# Patient Record
Sex: MALE | Race: WHITE | NOT HISPANIC OR LATINO | Employment: OTHER | URBAN - NONMETROPOLITAN AREA
[De-identification: names, ages, dates, MRNs, and addresses within clinical notes are randomized per-mention and may not be internally consistent; named-entity substitution may affect disease eponyms.]

---

## 2018-05-24 ENCOUNTER — OFFICE VISIT CONVERTED (OUTPATIENT)
Dept: FAMILY MEDICINE CLINIC | Age: 68
End: 2018-05-24
Attending: FAMILY MEDICINE

## 2019-01-10 ENCOUNTER — OFFICE VISIT CONVERTED (OUTPATIENT)
Dept: FAMILY MEDICINE CLINIC | Age: 69
End: 2019-01-10
Attending: FAMILY MEDICINE

## 2019-01-10 ENCOUNTER — HOSPITAL ENCOUNTER (OUTPATIENT)
Dept: OTHER | Facility: HOSPITAL | Age: 69
Discharge: HOME | End: 2019-01-10
Attending: FAMILY MEDICINE

## 2019-01-10 LAB
ALBUMIN SERPL-MCNC: 4.4 G/DL (ref 3.5–5)
ALBUMIN/GLOB SERPL: 1.5 {RATIO} (ref 1.4–2.6)
ALP SERPL-CCNC: 107 U/L (ref 56–155)
ALT SERPL-CCNC: 22 U/L (ref 10–40)
ANION GAP SERPL CALC-SCNC: 17 MMOL/L (ref 8–19)
AST SERPL-CCNC: 21 U/L (ref 15–50)
BILIRUB SERPL-MCNC: 0.49 MG/DL (ref 0.2–1.3)
BUN SERPL-MCNC: 18 MG/DL (ref 5–25)
BUN/CREAT SERPL: 17 {RATIO} (ref 6–20)
CALCIUM SERPL-MCNC: 9.7 MG/DL (ref 8.7–10.4)
CHLORIDE SERPL-SCNC: 102 MMOL/L (ref 99–111)
CHOLEST SERPL-MCNC: 155 MG/DL (ref 107–200)
CHOLEST/HDLC SERPL: 3.2 {RATIO} (ref 3–6)
CONV CO2: 27 MMOL/L (ref 22–32)
CONV TOTAL PROTEIN: 7.4 G/DL (ref 6.3–8.2)
CREAT UR-MCNC: 1.06 MG/DL (ref 0.7–1.2)
GFR SERPLBLD BASED ON 1.73 SQ M-ARVRAT: >60 ML/MIN/{1.73_M2}
GLOBULIN UR ELPH-MCNC: 3 G/DL (ref 2–3.5)
GLUCOSE SERPL-MCNC: 86 MG/DL (ref 70–99)
HDLC SERPL-MCNC: 49 MG/DL (ref 40–60)
LDLC SERPL CALC-MCNC: 75 MG/DL (ref 70–100)
MAGNESIUM SERPL-MCNC: 1.97 MG/DL (ref 1.6–2.3)
OSMOLALITY SERPL CALC.SUM OF ELEC: 295 MOSM/KG (ref 273–304)
POTASSIUM SERPL-SCNC: 4.1 MMOL/L (ref 3.5–5.3)
SODIUM SERPL-SCNC: 142 MMOL/L (ref 135–147)
TRIGL SERPL-MCNC: 156 MG/DL (ref 40–150)
TSH SERPL-ACNC: 4.11 M[IU]/L (ref 0.27–4.2)
VLDLC SERPL-MCNC: 31 MG/DL (ref 5–37)

## 2019-04-11 ENCOUNTER — OFFICE VISIT CONVERTED (OUTPATIENT)
Dept: FAMILY MEDICINE CLINIC | Age: 69
End: 2019-04-11
Attending: NURSE PRACTITIONER

## 2019-10-03 ENCOUNTER — HOSPITAL ENCOUNTER (OUTPATIENT)
Dept: OTHER | Facility: HOSPITAL | Age: 69
Discharge: HOME | End: 2019-10-03
Attending: FAMILY MEDICINE

## 2019-10-03 ENCOUNTER — OFFICE VISIT CONVERTED (OUTPATIENT)
Dept: FAMILY MEDICINE CLINIC | Age: 69
End: 2019-10-03
Attending: FAMILY MEDICINE

## 2019-10-03 LAB
ALBUMIN SERPL-MCNC: 4.5 G/DL (ref 3.5–5)
ALBUMIN/GLOB SERPL: 1.7 {RATIO} (ref 1.4–2.6)
ALP SERPL-CCNC: 111 U/L (ref 56–155)
ALT SERPL-CCNC: 23 U/L (ref 10–40)
ANION GAP SERPL CALC-SCNC: 24 MMOL/L (ref 8–19)
AST SERPL-CCNC: 24 U/L (ref 15–50)
BILIRUB SERPL-MCNC: 0.59 MG/DL (ref 0.2–1.3)
BUN SERPL-MCNC: 18 MG/DL (ref 5–25)
BUN/CREAT SERPL: 16 {RATIO} (ref 6–20)
CALCIUM SERPL-MCNC: 10.2 MG/DL (ref 8.7–10.4)
CHLORIDE SERPL-SCNC: 102 MMOL/L (ref 99–111)
CONV CO2: 20 MMOL/L (ref 22–32)
CONV TOTAL PROTEIN: 7.2 G/DL (ref 6.3–8.2)
CREAT UR-MCNC: 1.12 MG/DL (ref 0.7–1.2)
GFR SERPLBLD BASED ON 1.73 SQ M-ARVRAT: >60 ML/MIN/{1.73_M2}
GLOBULIN UR ELPH-MCNC: 2.7 G/DL (ref 2–3.5)
GLUCOSE SERPL-MCNC: 89 MG/DL (ref 70–99)
OSMOLALITY SERPL CALC.SUM OF ELEC: 295 MOSM/KG (ref 273–304)
POTASSIUM SERPL-SCNC: 3.8 MMOL/L (ref 3.5–5.3)
PSA SERPL-MCNC: 2.69 NG/ML (ref 0–4)
SODIUM SERPL-SCNC: 142 MMOL/L (ref 135–147)
TSH SERPL-ACNC: 3.36 M[IU]/L (ref 0.27–4.2)

## 2020-04-08 ENCOUNTER — HOSPITAL ENCOUNTER (OUTPATIENT)
Dept: OTHER | Facility: HOSPITAL | Age: 70
Discharge: HOME | End: 2020-04-08
Attending: NURSE PRACTITIONER

## 2020-04-08 ENCOUNTER — OFFICE VISIT CONVERTED (OUTPATIENT)
Dept: FAMILY MEDICINE CLINIC | Age: 70
End: 2020-04-08
Attending: NURSE PRACTITIONER

## 2020-04-08 LAB
ALBUMIN SERPL-MCNC: 4.1 G/DL (ref 3.5–5)
ALBUMIN/GLOB SERPL: 1.2 {RATIO} (ref 1.4–2.6)
ALP SERPL-CCNC: 122 U/L (ref 56–155)
ALT SERPL-CCNC: 30 U/L (ref 10–40)
ANION GAP SERPL CALC-SCNC: 19 MMOL/L (ref 8–19)
AST SERPL-CCNC: 37 U/L (ref 15–50)
BASOPHILS # BLD MANUAL: 0.01 10*3/UL (ref 0–0.2)
BASOPHILS NFR BLD MANUAL: 0.2 % (ref 0–3)
BILIRUB SERPL-MCNC: 0.46 MG/DL (ref 0.2–1.3)
BUN SERPL-MCNC: 35 MG/DL (ref 5–25)
BUN/CREAT SERPL: 19 {RATIO} (ref 6–20)
CALCIUM SERPL-MCNC: 9.1 MG/DL (ref 8.7–10.4)
CHLORIDE SERPL-SCNC: 97 MMOL/L (ref 99–111)
CONV CO2: 23 MMOL/L (ref 22–32)
CONV TOTAL PROTEIN: 7.5 G/DL (ref 6.3–8.2)
CREAT UR-MCNC: 1.88 MG/DL (ref 0.7–1.2)
DEPRECATED RDW RBC AUTO: 42.9 FL
EOSINOPHIL # BLD MANUAL: 0.01 10*3/UL (ref 0–0.7)
EOSINOPHIL NFR BLD MANUAL: 0.2 % (ref 0–7)
ERYTHROCYTE [DISTWIDTH] IN BLOOD BY AUTOMATED COUNT: 12.6 % (ref 11.5–14.5)
GFR SERPLBLD BASED ON 1.73 SQ M-ARVRAT: 35 ML/MIN/{1.73_M2}
GLOBULIN UR ELPH-MCNC: 3.4 G/DL (ref 2–3.5)
GLUCOSE SERPL-MCNC: 102 MG/DL (ref 70–99)
GRANS (ABSOLUTE): 3.64 10*3/UL (ref 2–8)
GRANS: 65.6 % (ref 30–85)
HBA1C MFR BLD: 13.9 G/DL (ref 14–18)
HCT VFR BLD AUTO: 40.2 % (ref 42–52)
IMM GRANULOCYTES # BLD: 0.01 10*3/UL (ref 0–0.54)
IMM GRANULOCYTES NFR BLD: 0.2 % (ref 0–0.43)
LYMPHOCYTES # BLD MANUAL: 1.46 10*3/UL (ref 1–5)
LYMPHOCYTES NFR BLD MANUAL: 7.4 % (ref 3–10)
MCH RBC QN AUTO: 31.6 PG (ref 27–31)
MCHC RBC AUTO-ENTMCNC: 34.6 G/DL (ref 33–37)
MCV RBC AUTO: 91.4 FL (ref 80–96)
MONOCYTES # BLD AUTO: 0.41 10*3/UL (ref 0.2–1.2)
OSMOLALITY SERPL CALC.SUM OF ELEC: 288 MOSM/KG (ref 273–304)
PLATELET # BLD AUTO: 157 10*3/UL (ref 130–400)
PMV BLD AUTO: 9.8 FL (ref 7.4–10.4)
POTASSIUM SERPL-SCNC: 3.8 MMOL/L (ref 3.5–5.3)
RBC # BLD AUTO: 4.4 10*6/UL (ref 4.7–6.1)
SODIUM SERPL-SCNC: 135 MMOL/L (ref 135–147)
TESTOST SERPL-MCNC: 111 NG/DL (ref 193–740)
TSH SERPL-ACNC: 2.36 M[IU]/L (ref 0.27–4.2)
VARIANT LYMPHS NFR BLD MANUAL: 26.4 % (ref 20–45)
VIT B12 SERPL-MCNC: 1468 PG/ML (ref 211–911)
WBC # BLD AUTO: 5.54 10*3/UL (ref 4.8–10.8)

## 2020-04-13 ENCOUNTER — OFFICE VISIT CONVERTED (OUTPATIENT)
Dept: FAMILY MEDICINE CLINIC | Age: 70
End: 2020-04-13
Attending: FAMILY MEDICINE

## 2020-04-13 ENCOUNTER — HOSPITAL ENCOUNTER (OUTPATIENT)
Dept: OTHER | Facility: HOSPITAL | Age: 70
Discharge: HOME | End: 2020-04-13
Attending: FAMILY MEDICINE

## 2020-04-13 LAB
ALBUMIN SERPL-MCNC: 3.5 G/DL (ref 3.5–5)
ALBUMIN/GLOB SERPL: 1.1 {RATIO} (ref 1.4–2.6)
ALP SERPL-CCNC: 115 U/L (ref 56–155)
ALT SERPL-CCNC: 30 U/L (ref 10–40)
ANION GAP SERPL CALC-SCNC: 19 MMOL/L (ref 8–19)
AST SERPL-CCNC: 43 U/L (ref 15–50)
BILIRUB SERPL-MCNC: 0.48 MG/DL (ref 0.2–1.3)
BNP SERPL-MCNC: 117 PG/ML (ref 0–900)
BUN SERPL-MCNC: 39 MG/DL (ref 5–25)
BUN/CREAT SERPL: 19 {RATIO} (ref 6–20)
CALCIUM SERPL-MCNC: 9.1 MG/DL (ref 8.7–10.4)
CHLORIDE SERPL-SCNC: 96 MMOL/L (ref 99–111)
CK SERPL-CCNC: 238 U/L (ref 57–374)
CONV CO2: 23 MMOL/L (ref 22–32)
CONV TOTAL PROTEIN: 6.8 G/DL (ref 6.3–8.2)
CREAT UR-MCNC: 2.03 MG/DL (ref 0.7–1.2)
ERYTHROCYTE [DISTWIDTH] IN BLOOD BY AUTOMATED COUNT: 12.8 % (ref 11.6–14.4)
GFR SERPLBLD BASED ON 1.73 SQ M-ARVRAT: 32 ML/MIN/{1.73_M2}
GLOBULIN UR ELPH-MCNC: 3.3 G/DL (ref 2–3.5)
GLUCOSE SERPL-MCNC: 99 MG/DL (ref 70–99)
HCT VFR BLD AUTO: 38.2 % (ref 42–52)
HGB BLD-MCNC: 13.5 G/DL (ref 14–18)
MCH RBC QN AUTO: 32.2 PG (ref 27–31)
MCHC RBC AUTO-ENTMCNC: 35.3 G/DL (ref 33–37)
MCV RBC AUTO: 91.2 FL (ref 80–96)
OSMOLALITY SERPL CALC.SUM OF ELEC: 289 MOSM/KG (ref 273–304)
PLATELET # BLD AUTO: 222 10*3/UL (ref 130–400)
PMV BLD AUTO: 10.7 FL (ref 9.4–12.4)
POTASSIUM SERPL-SCNC: 3.2 MMOL/L (ref 3.5–5.3)
RBC # BLD AUTO: 4.19 10*6/UL (ref 4.7–6.1)
SODIUM SERPL-SCNC: 135 MMOL/L (ref 135–147)
WBC # BLD AUTO: 7.26 10*3/UL (ref 4.8–10.8)

## 2020-04-14 ENCOUNTER — HOSPITAL ENCOUNTER (OUTPATIENT)
Dept: OTHER | Facility: HOSPITAL | Age: 70
Discharge: HOME | End: 2020-04-14
Attending: FAMILY MEDICINE

## 2020-04-14 LAB — B BURGDOR IGG+IGM SER-ACNC: <0.91 ISR (ref 0–0.9)

## 2020-04-16 ENCOUNTER — HOSPITAL ENCOUNTER (OUTPATIENT)
Dept: OTHER | Facility: HOSPITAL | Age: 70
Discharge: HOME | End: 2020-04-16
Attending: FAMILY MEDICINE

## 2020-04-16 LAB
ANION GAP SERPL CALC-SCNC: 18 MMOL/L (ref 8–19)
APPEARANCE UR: CLEAR
BACTERIA UR QL AUTO: NORMAL
BILIRUB UR QL: NEGATIVE
BUN SERPL-MCNC: 16 MG/DL (ref 5–25)
BUN/CREAT SERPL: 15 {RATIO} (ref 6–20)
CALCIUM SERPL-MCNC: 9.7 MG/DL (ref 8.7–10.4)
CASTS URNS QL MICRO: NORMAL /[LPF]
CHLORIDE SERPL-SCNC: 101 MMOL/L (ref 99–111)
COLOR UR: YELLOW
CONV CO2: 25 MMOL/L (ref 22–32)
CONV LEUKOCYTE ESTERASE: NEGATIVE
CONV UROBILINOGEN IN URINE BY AUTOMATED TEST STRIP: 0.2 {EHRLICHU}/DL (ref 0.1–1)
CREAT UR-MCNC: 1.09 MG/DL (ref 0.7–1.2)
EPI CELLS #/AREA URNS HPF: NORMAL /[HPF]
GFR SERPLBLD BASED ON 1.73 SQ M-ARVRAT: >60 ML/MIN/{1.73_M2}
GLUCOSE 24H UR-MCNC: NEGATIVE MG/DL
GLUCOSE SERPL-MCNC: 94 MG/DL (ref 70–99)
HGB UR QL STRIP: NEGATIVE
KETONES UR QL STRIP: NEGATIVE MG/DL
MAGNESIUM SERPL-MCNC: 1.64 MG/DL (ref 1.6–2.3)
MUCOUS THREADS URNS QL MICRO: NORMAL
NITRITE UR-MCNC: NEGATIVE MG/ML
OSMOLALITY SERPL CALC.SUM OF ELEC: 291 MOSM/KG (ref 273–304)
PH UR STRIP.AUTO: 7 [PH] (ref 5–8)
POTASSIUM SERPL-SCNC: 3.8 MMOL/L (ref 3.5–5.3)
PROT UR-MCNC: NEGATIVE MG/DL
RBC # BLD AUTO: NORMAL /[HPF]
SODIUM SERPL-SCNC: 140 MMOL/L (ref 135–147)
SP GR UR STRIP: 1.01 (ref 1–1.03)
SPECIMEN SOURCE: NORMAL
UNIDENT CRYS URNS QL MICRO: NORMAL /[HPF]
WBC #/AREA URNS HPF: NORMAL /[HPF]

## 2020-04-17 LAB — SARS-COV-2 RNA SPEC QL NAA+PROBE: NOT DETECTED

## 2020-05-21 ENCOUNTER — HOSPITAL ENCOUNTER (OUTPATIENT)
Dept: OTHER | Facility: HOSPITAL | Age: 70
Discharge: HOME | End: 2020-05-21
Attending: FAMILY MEDICINE

## 2020-05-21 ENCOUNTER — OFFICE VISIT CONVERTED (OUTPATIENT)
Dept: FAMILY MEDICINE CLINIC | Age: 70
End: 2020-05-21
Attending: FAMILY MEDICINE

## 2020-05-21 LAB
ANION GAP SERPL CALC-SCNC: 16 MMOL/L (ref 8–19)
APPEARANCE UR: CLEAR
BACTERIA UR QL AUTO: NORMAL
BILIRUB UR QL: NEGATIVE
BUN SERPL-MCNC: 15 MG/DL (ref 5–25)
BUN/CREAT SERPL: 17 {RATIO} (ref 6–20)
CALCIUM SERPL-MCNC: 9.2 MG/DL (ref 8.7–10.4)
CASTS URNS QL MICRO: NORMAL /[LPF]
CHLORIDE SERPL-SCNC: 103 MMOL/L (ref 99–111)
COLOR UR: YELLOW
CONV CO2: 25 MMOL/L (ref 22–32)
CONV LEUKOCYTE ESTERASE: NEGATIVE
CONV UROBILINOGEN IN URINE BY AUTOMATED TEST STRIP: 0.2 {EHRLICHU}/DL (ref 0.1–1)
CREAT UR-MCNC: 0.89 MG/DL (ref 0.7–1.2)
EPI CELLS #/AREA URNS HPF: NORMAL /[HPF]
GFR SERPLBLD BASED ON 1.73 SQ M-ARVRAT: >60 ML/MIN/{1.73_M2}
GLUCOSE 24H UR-MCNC: NEGATIVE MG/DL
GLUCOSE SERPL-MCNC: 91 MG/DL (ref 70–99)
HGB UR QL STRIP: NEGATIVE
KETONES UR QL STRIP: NEGATIVE MG/DL
MAGNESIUM SERPL-MCNC: 2.17 MG/DL (ref 1.6–2.3)
MUCOUS THREADS URNS QL MICRO: NORMAL
NITRITE UR-MCNC: NEGATIVE MG/ML
OSMOLALITY SERPL CALC.SUM OF ELEC: 288 MOSM/KG (ref 273–304)
PH UR STRIP.AUTO: 7 [PH] (ref 5–8)
POTASSIUM SERPL-SCNC: 4.8 MMOL/L (ref 3.5–5.3)
PROT UR-MCNC: NEGATIVE MG/DL
RBC # BLD AUTO: NORMAL /[HPF]
SODIUM SERPL-SCNC: 139 MMOL/L (ref 135–147)
SP GR UR STRIP: 1.02 (ref 1–1.03)
SPECIMEN SOURCE: NORMAL
UNIDENT CRYS URNS QL MICRO: NORMAL /[HPF]
WBC #/AREA URNS HPF: NORMAL /[HPF]

## 2020-08-06 ENCOUNTER — HOSPITAL ENCOUNTER (OUTPATIENT)
Dept: OTHER | Facility: HOSPITAL | Age: 70
Discharge: HOME | End: 2020-08-06
Attending: FAMILY MEDICINE

## 2020-08-06 ENCOUNTER — OFFICE VISIT CONVERTED (OUTPATIENT)
Dept: FAMILY MEDICINE CLINIC | Age: 70
End: 2020-08-06
Attending: FAMILY MEDICINE

## 2020-08-06 LAB
ALBUMIN SERPL-MCNC: 4.5 G/DL (ref 3.5–5)
ALBUMIN/GLOB SERPL: 1.4 {RATIO} (ref 1.4–2.6)
ALP SERPL-CCNC: 103 U/L (ref 56–155)
ALT SERPL-CCNC: 21 U/L (ref 10–40)
ANION GAP SERPL CALC-SCNC: 17 MMOL/L (ref 8–19)
APPEARANCE UR: CLEAR
AST SERPL-CCNC: 24 U/L (ref 15–50)
BILIRUB SERPL-MCNC: 0.48 MG/DL (ref 0.2–1.3)
BILIRUB UR QL: NEGATIVE
BUN SERPL-MCNC: 17 MG/DL (ref 5–25)
BUN/CREAT SERPL: 14 {RATIO} (ref 6–20)
CALCIUM SERPL-MCNC: 10.5 MG/DL (ref 8.7–10.4)
CHLORIDE SERPL-SCNC: 99 MMOL/L (ref 99–111)
CHOLEST SERPL-MCNC: 167 MG/DL (ref 107–200)
CHOLEST/HDLC SERPL: 3.4 {RATIO} (ref 3–6)
COLOR UR: YELLOW
CONV BACTERIA: NEGATIVE
CONV CO2: 28 MMOL/L (ref 22–32)
CONV COLLECTION SOURCE (UA): NORMAL
CONV TOTAL PROTEIN: 7.7 G/DL (ref 6.3–8.2)
CONV UROBILINOGEN IN URINE BY AUTOMATED TEST STRIP: 0.2 {EHRLICHU}/DL (ref 0.1–1)
CREAT UR-MCNC: 1.19 MG/DL (ref 0.7–1.2)
GFR SERPLBLD BASED ON 1.73 SQ M-ARVRAT: >60 ML/MIN/{1.73_M2}
GLOBULIN UR ELPH-MCNC: 3.2 G/DL (ref 2–3.5)
GLUCOSE SERPL-MCNC: 89 MG/DL (ref 70–99)
GLUCOSE UR QL: NEGATIVE MG/DL
HDLC SERPL-MCNC: 49 MG/DL (ref 40–60)
HGB UR QL STRIP: NEGATIVE
KETONES UR QL STRIP: NEGATIVE MG/DL
LDLC SERPL CALC-MCNC: 97 MG/DL (ref 70–100)
LEUKOCYTE ESTERASE UR QL STRIP: NEGATIVE
NITRITE UR QL STRIP: NEGATIVE
OSMOLALITY SERPL CALC.SUM OF ELEC: 291 MOSM/KG (ref 273–304)
PH UR STRIP.AUTO: 7.5 [PH] (ref 5–8)
POTASSIUM SERPL-SCNC: 3.9 MMOL/L (ref 3.5–5.3)
PROT UR QL: NEGATIVE MG/DL
RBC #/AREA URNS HPF: NORMAL /[HPF]
SODIUM SERPL-SCNC: 140 MMOL/L (ref 135–147)
SP GR UR: 1.01 (ref 1–1.03)
TRIGL SERPL-MCNC: 105 MG/DL (ref 40–150)
VLDLC SERPL-MCNC: 21 MG/DL (ref 5–37)
WBC #/AREA URNS HPF: NORMAL /[HPF]

## 2021-04-22 ENCOUNTER — OFFICE VISIT CONVERTED (OUTPATIENT)
Dept: FAMILY MEDICINE CLINIC | Age: 71
End: 2021-04-22
Attending: FAMILY MEDICINE

## 2021-04-22 ENCOUNTER — HOSPITAL ENCOUNTER (OUTPATIENT)
Dept: OTHER | Facility: HOSPITAL | Age: 71
Discharge: HOME | End: 2021-04-22
Attending: FAMILY MEDICINE

## 2021-04-22 LAB
ALBUMIN SERPL-MCNC: 4.4 G/DL (ref 3.5–5)
ALBUMIN/GLOB SERPL: 1.5 {RATIO} (ref 1.4–2.6)
ALP SERPL-CCNC: 118 U/L (ref 56–155)
ALT SERPL-CCNC: 19 U/L (ref 10–40)
ANION GAP SERPL CALC-SCNC: 20 MMOL/L (ref 8–19)
AST SERPL-CCNC: 23 U/L (ref 15–50)
BILIRUB SERPL-MCNC: 0.69 MG/DL (ref 0.2–1.3)
BUN SERPL-MCNC: 16 MG/DL (ref 5–25)
BUN/CREAT SERPL: 16 {RATIO} (ref 6–20)
CALCIUM SERPL-MCNC: 9.6 MG/DL (ref 8.7–10.4)
CHLORIDE SERPL-SCNC: 101 MMOL/L (ref 99–111)
CONV CO2: 25 MMOL/L (ref 22–32)
CONV TOTAL PROTEIN: 7.3 G/DL (ref 6.3–8.2)
CREAT UR-MCNC: 1.03 MG/DL (ref 0.7–1.2)
GFR SERPLBLD BASED ON 1.73 SQ M-ARVRAT: >60 ML/MIN/{1.73_M2}
GLOBULIN UR ELPH-MCNC: 2.9 G/DL (ref 2–3.5)
GLUCOSE SERPL-MCNC: 93 MG/DL (ref 70–99)
OSMOLALITY SERPL CALC.SUM OF ELEC: 295 MOSM/KG (ref 273–304)
POTASSIUM SERPL-SCNC: 4.2 MMOL/L (ref 3.5–5.3)
PSA SERPL-MCNC: 3.16 NG/ML (ref 0–4)
SODIUM SERPL-SCNC: 142 MMOL/L (ref 135–147)
TSH SERPL-ACNC: 3.07 M[IU]/L (ref 0.27–4.2)

## 2021-05-18 NOTE — PROGRESS NOTES
"Eliud Ramirez  1950     Office/Outpatient Visit    Visit Date: Wed, Apr 8, 2020 10:23 am    Provider: Tonya Werner N.P. (Assistant: Laurence Jiménez RN)    Location: [facility]        Electronically signed by Tonya Werner N.P. on  04/09/2020 02:06:29 PM                             Subjective:        CC: Tolu is a 69 year old White male.  Decrease energy/sleeping a lot, decreased appetite         HPI: declines telehealth visit          Other fatigue noted.  Patient describes problem of moderate fatigue for the last 1 to 2 weeks.  This has been a constant problem.  Sleep quality: no problems with initiation or maintenance of sleep.  He averages 7 to 8 hours of sleep per night.  Patient states that he is not depressed.  Associated symptoms include snoring.  He denies abdominal pain, adenopathy, anxiety, arthralgias, bruising, changes in bowel habits, chest pain, chills, cough, dizziness, dyspnea, headaches, muscle weakness, myalgia, nausea, night sweats, pedal edema, vomiting or weight change.  Currently, he is doing well without any significant affective symptoms.  Tolu denies significant social or occupational stressors.  Other details: states temp was 101 after shower this am so thinks it was inaccurate.  temp 99.6 here today.  not aware of any other temperature elevation.  denies feeling sick-  \"I just feel tired.\"    naps once per day.  no ill contacts..      ROS:     CONSTITUTIONAL:  Positive for fatigue.   Negative for chills.      E/N/T:  Negative for nasal congestion, frequent rhinorrhea and sore throat.      CARDIOVASCULAR:  Negative for chest pain, palpitations, tachycardia, orthopnea, and edema.      RESPIRATORY:  Negative for cough, dyspnea, and hemoptysis.      GASTROINTESTINAL:  Negative for abdominal pain, heartburn, constipation, diarrhea, and stool changes.      GENITOURINARY:  Negative for dysuria, genital lesions, hematuria, impotence, polyuria, and changes in " urine stream.      MUSCULOSKELETAL:  Negative for arthralgias, back pain, and myalgias.      INTEGUMENTARY:  Negative for rash.      NEUROLOGICAL:  Negative for dizziness, headaches, paresthesias, and weakness.      PSYCHIATRIC:  Negative for anxiety, depression, and sleep disturbances.          Past Medical History / Family History / Social History:         Last Reviewed on 10/03/2019 09:09 AM by Franky Brenner    Past Medical History:             PAST MEDICAL HISTORY         Hyperlipidemia: Hypercholesterolemia;     Hypertension         PREVENTIVE HEALTH MAINTENANCE             COLORECTAL CANCER SCREENING: Up to date (colonoscopy q10y; sigmoidoscopy q5y; Cologuard q3y) was last done 06/2018, Results are in chart; Cologuard result abnormal - patient declined colonoscopy referral     EYE EXAM: was last done maybe 2010         PAST MEDICAL HISTORY             CURRENT MEDICAL PROVIDERS:    Ophthalmologist: unknown name             ADVANCED DIRECTIVES: None         Surgical History:     NONE         Family History:         Positive for Hyperlipidemia ( mother ).          Social History:     Occupation:    Retired     Children: 2 children         Tobacco/Alcohol/Supplements:     Last Reviewed on 10/03/2019 09:09 AM by Franky Brenner    Tobacco: He has a past history of cigarette smoking; quit date:  1980s.          Alcohol: Frequency:    'none to speak of';         Substance Abuse History:     Last Reviewed on 10/03/2019 09:09 AM by Franky Brenner        Mental Health History:     Last Reviewed on 10/03/2019 09:09 AM by Franky Brenner        Communicable Diseases (eg STDs):     Last Reviewed on 10/03/2019 09:09 AM by Franky Brenner        Current Problems:     Last Reviewed on 10/03/2019 09:09 AM by Franky Brenner    Other specified disorders of thyroid    Mixed hyperlipidemia    Essential (primary) hypertension    Body mass index (BMI) 34.0-34.9, adult    Other fatigue         Immunizations:     Prevnar 13 (Pneumococcal PCV 13) 3/30/2017    PNEUMOVAX 23 (Pneumococcal PPV23) 1/10/2019        Allergies:     Last Reviewed on 4/08/2020 10:26 AM by Laurence Jiménez    No Known Allergies.        Current Medications:     Last Reviewed on 4/08/2020 10:26 AM by Laurence Jiménez    aspirin 81 mg oral tablet, delayed release (enteric coated) [1 tab daily]    Multivitamins     Amlodipine  10 mg oral tablet [Take 1 tablet(s) by mouth daily]    triamterene-hydrochlorothiazid 37.5-25 mg oral tablet [Take 1 tablet by mouth once daily]    Losartan 100 mg oral tablet [Take 1 tablet(s) by mouth daily]    atorvastatin 20 mg oral tablet [Take 1 tablet(s) by mouth daily]        Objective:        Vitals:         Historical:     10/3/2019  BP:   128/61 mm Hg ( (right arm, , sitting, );) 10/3/2019  Wt:   239.2lbs    Current: 4/8/2020 10:27:34 AM    Ht:  5 ft, 9.5 in;  Wt: 228 lbs;  BMI: 33.2T: 99.6 F (oral);  BP: 123/56 mm Hg (right arm, sitting);  P: 67 bpm (right arm (BP Cuff), sitting);  sCr: 1.12 mg/dL;  GFR: 65.72        Exams:     PHYSICAL EXAM:     GENERAL:  well developed and nourished; appropriately groomed; in no apparent distress;     E/N/T: EARS: bilateral TMs are normal;  NOSE: normal nasal mucosa; OROPHARYNX: posterior pharynx, including tonsils, tongue, and uvula are normal;     RESPIRATORY: normal respiratory rate and pattern with no distress; normal breath sounds with no rales, rhonchi, wheezes or rubs;     CARDIOVASCULAR: normal rate; rhythm is regular;  no edema;     LYMPHATIC: no enlargement of cervical or facial nodes;     MUSCULOSKELETAL:  Normal range of motion, strength and tone;     NEUROLOGIC: mental status: alert and oriented x 3; GROSSLY INTACT     PSYCHIATRIC:  appropriate affect and demeanor; normal speech pattern; grossly normal memory;         Assessment:         R53.83   Other fatigue           ORDERS:         Lab Orders:       [id number]  [facility] MALE FATIGUE CBC, CMP, TSH, B12,  Testosterone levels  (Send-Out)                      Plan:         Other fatigue    LABORATORY:  Labs ordered to be performed today include Male Fatigue Panel (CBC, CMP, TSH, B12, & Testosterone levels).      RECOMMENDATIONS given include: normal exam with acute onset fatigue.  he reports one temp elevation of questionable accuracy and 99.6 here in office.  advise that any time fever is reported it is wise to rule out strep, flu, UTI.  he declines as he is asymptomatic.  suggest starting with labs.  pt agreeable.  no new medications.  has discussed possible sleep study with dr byrne in the past as he does snore but concerns stated today are recent..            Orders:       [id number]  [facility] MALE FATIGUE CBC, CMP, TSH, B12, Testosterone levels  (Send-Out)                  Charge Capture:         Primary Diagnosis:     R53.83  Other fatigue           Orders:      [id number]  Office/outpatient visit; established patient, level 3  (In-House)

## 2021-05-18 NOTE — PROGRESS NOTES
JamesEliud  1950     Office/Outpatient Visit    Visit Date: Thu, Aug 6, 2020 10:22 am    Provider: Franky Brenner MD (Assistant: Laurence Jiménez RN)    Location: [facility]        Electronically signed by Franky Brenner MD on  08/10/2020 07:30:39 AM                             Subjective:        CC: blood pressure, cholesterol    HPI:           Patient presents with mixed hyperlipidemia.  Current treatment includes Lipitor and a low cholesterol/low fat diet.  Compliance with treatment has been good; he takes his medication as directed and follows up as directed.  He denies experiencing any hypercholesterolemia related symptoms.            Essential (primary) hypertension details; his current cardiac medication regimen includes a calcium channel blocker ( Norvasc ), an angiotensin receptor blocker ( Cozaar ), and a combination medication ( Dyazide ).  Review of his blood pressure log reveals systolics in the 120-140 range.  He is tolerating the medication well without side effects.  Compliance with treatment has been good; he takes his medication as directed and follows up as directed.      ROS:     CONSTITUTIONAL:  Negative for chills and fever.      CARDIOVASCULAR:  Negative for chest pain and palpitations.      RESPIRATORY:  Negative for recent cough and dyspnea.      GASTROINTESTINAL:  Negative for abdominal pain, nausea and vomiting.      INTEGUMENTARY:  Negative for atypical mole(s) and rash.          Past Medical History / Family History / Social History:         Last Reviewed on 5/21/2020 10:33 AM by Franky Brenner    Past Medical History:             PAST MEDICAL HISTORY         Hyperlipidemia: Hypercholesterolemia;     Hypertension         PREVENTIVE HEALTH MAINTENANCE             COLORECTAL CANCER SCREENING: Up to date (colonoscopy q10y; sigmoidoscopy q5y; Cologuard q3y) was last done 06/2018, Results are in chart; Cologuard result abnormal - patient  declined colonoscopy referral     EYE EXAM: was last done maybe 2010         PAST MEDICAL HISTORY             CURRENT MEDICAL PROVIDERS:    Ophthalmologist: unknown name             ADVANCED DIRECTIVES: None         Surgical History:     NONE         Family History:         Positive for Hyperlipidemia ( mother ).          Social History:     Occupation:    Retired     Children: 2 children         Tobacco/Alcohol/Supplements:     Last Reviewed on 5/21/2020 10:33 AM by Franky Brenner    Tobacco: He has a past history of cigarette smoking; quit date:  1980s.          Alcohol: Frequency:    'none to speak of';         Substance Abuse History:     Last Reviewed on 5/21/2020 10:33 AM by Franky Brenner        Mental Health History:     Last Reviewed on 5/21/2020 10:33 AM by Franky Brenner        Communicable Diseases (eg STDs):     Last Reviewed on 5/21/2020 10:33 AM by Franky Brenner        Current Problems:     Last Reviewed on 5/21/2020 10:33 AM by Franky Brenner    Mixed hyperlipidemia    Essential (primary) hypertension    Body mass index (BMI) 34.0-34.9, adult    Other fatigue    Shortness of breath    Acute kidney failure, unspecified    Other viral pneumonia    Pain in leg, unspecified    Localized edema        Immunizations:     Prevnar 13 (Pneumococcal PCV 13) 3/30/2017    PNEUMOVAX 23 (Pneumococcal PPV23) 1/10/2019        Allergies:     Last Reviewed on 5/21/2020 10:33 AM by Franky Brenner    No Known Allergies.        Current Medications:     Last Reviewed on 8/06/2020 10:25 AM by Laurence Jiménez    aspirin 81 mg oral tablet, delayed release (enteric coated) [1 tab daily]    Multivitamins     amLODIPine 10 mg oral tablet [Take 1/2 tablet by mouth once daily]    triamterene-hydrochlorothiazid 37.5-25 mg oral tablet [Take 1 tablet by mouth once daily]    losartan 100 mg oral tablet [Take 1 tablet by mouth once daily]    atorvastatin 20 mg oral tablet [Take 1 tablet  by mouth once daily]        Objective:        Vitals:         Current: 8/6/2020 10:27:40 AM    Ht:  5 ft, 9.5 in;  Wt: 233.2 lbs;  BMI: 33.9T: 97.9 F (temporal);  BP: 149/63 mm Hg (left arm, sitting);  P: 62 bpm (left arm (BP Cuff), sitting);  sCr: 0.89 mg/dL;  GFR: 82.35        Exams:     PHYSICAL EXAM:     GENERAL: Vitals recorded well developed,  moderately obese;     EYES: extraocular movements intact; conjunctiva and cornea are normal; PERRL;     NECK: range of motion is normal; thyroid is non-palpable;     RESPIRATORY: normal respiratory rate and pattern with no distress; normal breath sounds with no rales, rhonchi, wheezes or rubs;     CARDIOVASCULAR: normal rate; rhythm is regular;  no systolic murmur; 1+ pedal edema;     GASTROINTESTINAL: nontender; normal bowel sounds; no masses;     LYMPHATIC: no enlargement of cervical or facial nodes; no supraclavicular nodes;     SKIN:  no significant rashes or lesions; no suspicious moles;     NEUROLOGIC: mental status: alert and oriented x 3; cranial nerves II-XII grossly intact;     PSYCHIATRIC: appropriate affect and demeanor; normal psychomotor function;         Assessment:         E78.2   Mixed hyperlipidemia       I10   Essential (primary) hypertension       M79.671   Pain in right foot       R60.0   Localized edema       I87.2   Venous insufficiency (chronic) (peripheral)           ORDERS:         Meds Prescribed:       [Refilled] triamterene-hydrochlorothiazid 37.5-25 mg oral tablet [Take 1 tablet by mouth once daily], #90 (ninety) each, Refills: 1 (one)       [Refilled] losartan 50 mg oral tablet [take 1 tablet (50 mg) by oral route once daily], #90 (ninety) tablets, Refills: 1 (one)       [Refilled] atorvastatin 20 mg oral tablet [Take 1 tablet by mouth once daily], #90 (ninety) tablets, Refills: 1 (one)       [Refilled] amLODIPine 10 mg oral tablet [take 1 tablet (10 mg) by oral route once daily], #90 (ninety) tablets, Refills: 1 (one)          Radiology/Test Orders:       [id number]  Colorectal CA screen results documented and reviewed (PV)  (In-House)              Lab Orders:       [id number]  HTN - [facility] CMP AND LIPID: [id number], [id number]  (Send-Out)            10145  BDUA - [facility] Urinalysis, automated, with micro  (Send-Out)              Other Orders:         Depression screen negative  (In-House)            1101F  Pt screen for fall risk; document no falls in past year or only 1 fall w/o injury in past year (SUSAN)  (In-House)                      Plan:         Mixed hyperlipidemia    LABORATORY:  Labs ordered to be performed today include HTN/Lipid Panel: CMP, Lipid.      RECOMMENDATIONS given include: Today, we have reviewed Tolu's care.  I'm going to recommend no changes in medications.  We have updated those though.  We will anticipate repeating labs this morning.  Compression stockings recommended.  We will follow closely..  MIPS PHQ-9 Depression Screening: Completed form scanned and in chart; Total Score 0; Negative Depression Screen           Prescriptions:       [Refilled] triamterene-hydrochlorothiazid 37.5-25 mg oral tablet [Take 1 tablet by mouth once daily], #90 (ninety) each, Refills: 1 (one)       [Refilled] losartan 50 mg oral tablet [take 1 tablet (50 mg) by oral route once daily], #90 (ninety) tablets, Refills: 1 (one)       [Refilled] atorvastatin 20 mg oral tablet [Take 1 tablet by mouth once daily], #90 (ninety) tablets, Refills: 1 (one)       [Refilled] amLODIPine 10 mg oral tablet [take 1 tablet (10 mg) by oral route once daily], #90 (ninety) tablets, Refills: 1 (one)           Orders:       [id number]  HTNLP - [facility] CMP AND LIPID: [id number], [id number]  (Send-Out)              Depression screen negative  (In-House)            1101F  Pt screen for fall risk; document no falls in past year or only 1 fall w/o injury in past year (SUSAN)  (In-House)            3017F  Colorectal CA screen results documented and reviewed (PV)  (In-House)              Essential  (primary) hypertension    LABORATORY:  Labs ordered to be performed today include urinalysis with micro.            Orders:       [id number]  [facility] Urinalysis, automated, with micro  (Send-Out)              Pain in right footAs above.        Localized edemaAs above.            Charge Capture:         Primary Diagnosis:     E78.2  Mixed hyperlipidemia           Orders:      [id number]  Office/outpatient visit; established patient, level 4  (In-House)              Depression screen negative  (In-House)            1101F  Pt screen for fall risk; document no falls in past year or only 1 fall w/o injury in past year (SUSAN)  (In-House)            3017F  Colorectal CA screen results documented and reviewed (PV)  (In-House)              I10  Essential (primary) hypertension     M79.671  Pain in right foot     R60.0  Localized edema     I87.2  Venous insufficiency (chronic) (peripheral)

## 2021-05-18 NOTE — PROGRESS NOTES
Eliud Ramirez 1950     Office/Outpatient Visit    Visit Date: Thu, Apr 11, 2019 04:18 pm    Provider: Christianne Salazar N.P. (Assistant: Elizabeth Haas MA)    Location: [facility]        Electronically signed by Christianne Salazar N.P. on  04/12/2019 12:11:55 PM                             SUBJECTIVE:        CC:     Tolu is a 68 year old White male.  He presents with sinus pressure, cough onset 1.5 weeks.          HPI:         Patient to be evaluated for uRI.  These have been present for the past 1.5 weeks.  The symptoms include chest congestion, productive cough, headache, nasal congestion, nasal discharge, sinus pain/pressure, sputum production and wheezing.  He denies fever.  He denies exposure to ill contacts.  He has already tried to relieve the symptoms with steroid nasal spray.  Medical history is significant for HTN.      ROS:     CONSTITUTIONAL:  Negative for chills, fatigue and fever.      E/N/T:  Positive for nasal congestion and sinus pressure.   Negative for sore throat.      CARDIOVASCULAR:  Negative for chest pain, orthopnea, paroxysmal nocturnal dyspnea and pedal edema.      RESPIRATORY:  Positive for recent cough ( with scant amounts of purulent sputum ) and frequent wheezing.   Negative for dyspnea.      GASTROINTESTINAL:  Negative for abdominal pain, heartburn, constipation, diarrhea, and stool changes.      PSYCHIATRIC:  Negative for anxiety and depression.          PMH/FMH/SH:     Last Reviewed on 4/12/2019 12:11 PM by Christianne Salazar    Past Medical History:             PAST MEDICAL HISTORY         Hyperlipidemia: Hypercholesterolemia;         PREVENTIVE HEALTH MAINTENANCE             COLORECTAL CANCER SCREENING: Up to date (colonoscopy q10y; sigmoidoscopy q5y; Cologuard q3y) was last done 06/2018, Results are in chart; Cologuard result abnormal - patient declined colonoscopy referral     EYE EXAM: was last done maybe 2010         PAST MEDICAL HISTORY              CURRENT MEDICAL PROVIDERS:    Ophthalmologist: unknown name             ADVANCED DIRECTIVES: None         Surgical History:     NONE         Family History:         Positive for Hyperlipidemia ( mother ).          Social History:     Occupation:    Retired     Children: 2 children         Tobacco/Alcohol/Supplements:     Last Reviewed on 4/12/2019 12:11 PM by Christianne Salazar    Tobacco: He has a past history of cigarette smoking; quit date:  1980s.          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         Current Medications:     Last Reviewed on 4/12/2019 12:11 PM by Christianne Salazar    Amlodipine  10mg Tablet Take 1 tablet(s) by mouth daily     Atorvastatin Calcium 20mg Tablet Take 1 tablet(s) by mouth daily     Losartan 100mg Tablet Take 1 tablet(s) by mouth daily     Triamterene/Hydrochlorothiazide 37.5mg/25mg Tablet Take 1 tablet(s) by mouth daily     Multivitamins     Aspirin (ASA) 81mg Tablets, Enteric Coated 1 tab daily         OBJECTIVE:        Vitals:         Current: 4/11/2019 4:24:44 PM    Ht:  5 ft, 9.5 in;  Wt: 231.6 lbs;  BMI: 33.7    T: 98.4 F (oral);  BP: 152/44 mm Hg (left arm, sitting);  P: 73 bpm (left arm (BP Cuff), sitting);  sCr: 1.06 mg/dL;  GFR: 70.87        Repeat:     4:26:10 PM     BP:   146/51mm Hg (right arm, sitting)         Exams:     PHYSICAL EXAM:     GENERAL: Vitals recorded well developed, well nourished;  well  groomed;  no apparent distress;     E/N/T:  normal EACs, TMs, nasal/oral mucosa, teeth, gingiva, and oropharynx;     RESPIRATORY: normal respiratory rate and pattern with no distress; coarse breath sounds throughout; rhonchi heard in the mild wade bases;  diffuse inspiratory and expiratory wheezes;     CARDIOVASCULAR: normal rate; rhythm is regular;  normal S1; normal S2; no systolic murmur; no cyanosis; no edema;     GASTROINTESTINAL: nontender, nondistended; no hepatosplenomegaly or masses; no bruits;     NEUROLOGIC: GROSSLY INTACT     PSYCHIATRIC:  appropriate affect and demeanor; normal speech pattern; grossly normal memory;         ASSESSMENT:           466.0   J20.8  Acute bronchitis              DDx:         ORDERS:         Meds Prescribed:       Augmentin (Amoxicillin/Clavulanate) 875mg/125mg Tablet 1 po bid with food  #20 (Twenty) tablet(s) Refills: 0       Guaifenesin 200mg Tablet take 2 tablets twice daily  #40 (Forty) tablet(s) Refills: 0       Medrol (Methylprednisolone) 4mg Dosepak Take as directed with food  #1 (One) dose pack Refills: 0                 PLAN:          Acute bronchitis Follow up prn, if not improving in 1 week RTO . dmt           Prescriptions:       Augmentin (Amoxicillin/Clavulanate) 875mg/125mg Tablet 1 po bid with food  #20 (Twenty) tablet(s) Refills: 0       Guaifenesin 200mg Tablet take 2 tablets twice daily  #40 (Forty) tablet(s) Refills: 0       Medrol (Methylprednisolone) 4mg Dosepak Take as directed with food  #1 (One) dose pack Refills: 0             CHARGE CAPTURE:           Primary Diagnosis:     466.0 Acute bronchitis            J20.8    Acute bronchitis due to other specified organisms              Orders:          [id number]   Office/outpatient visit; established patient, level 3  (In-House)

## 2021-05-18 NOTE — PROGRESS NOTES
Eliud Ramirez 1950     Office/Outpatient Visit    Visit Date: Thu, Oct 3, 2019 08:56 am    Provider: Franky Brenner MD     Location: [facility]        Electronically signed by Franky Brenner MD on  10/04/2019 05:32:36 AM                             SUBJECTIVE:        CC: blood pressure, cholesterol, positive Cologuard         HPI:         Tolu presents with essential hypertension.  His current cardiac medication regimen includes a calcium channel blocker ( Norvasc ), an angiotensin receptor blocker ( Cozaar ), and a combination medication ( Dyazide ).  Review of his blood pressure log reveals systolics in the 120-140 range.  He is tolerating the medication well without side effects.  Compliance with treatment has been good; he takes his medication as directed and follows up as directed.          With regard to the hypercholesterolemia, current treatment includes Lipitor and a low cholesterol/low fat diet.  Compliance with treatment has been good; he takes his medication as directed and follows up as directed.  He denies experiencing any hypercholesterolemia related symptoms.          Tolu did have a positive Cologuard results a little over a year ago.  We did recommend that he have colonoscopy done.  He declined then.  We have again discussed this today including potentially missing polyps that could become cancerous or even colon cancer.  He expresses understanding of the risks of not moving forward with testing.     ROS:     CONSTITUTIONAL:  Negative for chills and fever.      CARDIOVASCULAR:  Negative for chest pain and palpitations.      RESPIRATORY:  Negative for recent cough and dyspnea.      GASTROINTESTINAL:  Negative for abdominal pain, nausea and vomiting.      INTEGUMENTARY:  Negative for atypical mole(s) and rash.          PMH/FMH/SH:     Last Reviewed on 10/03/2019 09:09 AM by Franky Brenner    Past Medical History:             PAST MEDICAL HISTORY          Hyperlipidemia: Hypercholesterolemia;     Hypertension         PREVENTIVE HEALTH MAINTENANCE             COLORECTAL CANCER SCREENING: Up to date (colonoscopy q10y; sigmoidoscopy q5y; Cologuard q3y) was last done 06/2018, Results are in chart; Cologuard result abnormal - patient declined colonoscopy referral     EYE EXAM: was last done maybe 2010         PAST MEDICAL HISTORY             CURRENT MEDICAL PROVIDERS:    Ophthalmologist: unknown name             ADVANCED DIRECTIVES: None         Surgical History:     NONE         Family History:         Positive for Hyperlipidemia ( mother ).          Social History:     Occupation:    Retired     Children: 2 children         Tobacco/Alcohol/Supplements:     Last Reviewed on 10/03/2019 09:09 AM by Franky Brenner    Tobacco: He has a past history of cigarette smoking; quit date:  1980s.          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         Current Medications:     Last Reviewed on 10/03/2019 09:09 AM by Franky Brenner    Losartan 100mg Tablet Take 1 tablet(s) by mouth daily      Amlodipine  10mg Tablet Take 1 tablet(s) by mouth daily     Atorvastatin Calcium 20mg Tablet Take 1 tablet(s) by mouth daily     Triamterene/Hydrochlorothiazide 37.5mg/25mg Tablet Take 1 tablet(s) by mouth daily     Multivitamins     Aspirin (ASA) 81mg Tablets, Enteric Coated 1 tab daily         OBJECTIVE:        Vitals:         Current: 10/3/2019 9:14:02 AM    Ht:  5 ft, 9.5 in;  Wt: 239.2 lbs;  BMI: 34.8    T: 98 F (oral);  BP: 128/61 mm Hg (right arm, sitting);  P: 63 bpm (right arm (BP Cuff), sitting);  sCr: 1.06 mg/dL;  GFR: 70.87        Exams:     PHYSICAL EXAM:     GENERAL: Vitals recorded well developed,  moderately obese;     EYES: extraocular movements intact; conjunctiva and cornea are normal; PERRL;     E/N/T: EARS:  normal external auditory canals and tympanic membranes;  grossly normal hearing; OROPHARYNX:  normal mucosa, dentition, gingiva, and posterior pharynx;     NECK: range of motion is normal; thyroid is non-palpable;     RESPIRATORY: normal respiratory rate and pattern with no distress; normal breath sounds with no rales, rhonchi, wheezes or rubs;     CARDIOVASCULAR: normal rate; rhythm is regular;  no systolic murmur; trace pedal edema;     GASTROINTESTINAL: nontender; normal bowel sounds; no masses;     LYMPHATIC: no enlargement of cervical or facial nodes; no supraclavicular nodes;     SKIN:  no significant rashes or lesions; no suspicious moles;         ASSESSMENT           401.1   I10  Essential hypertension              DDx:     272.0   E78.2  Hypercholesterolemia              DDx:     V76.51   Z12.11  Screening for cancer of colon              DDx:     V76.44   Z12.5  Screening for prostate cancer              DDx:         ORDERS:         Meds Prescribed:       Refill of: Losartan 100mg Tablet Take 1 tablet(s) by mouth daily  #90 (Ninety) tablet(s) Refills: 1       Refill of: Amlodipine  10mg Tablet Take 1 tablet(s) by mouth daily  #90 (Ninety) tablet(s) Refills: 1       Refill of:  Atorvastatin Calcium 20mg Tablet Take 1 tablet(s) by mouth daily  #90 (Ninety) tablet(s) Refills: 1       Refill of: Triamterene/Hydrochlorothiazide 37.5mg/25mg Tablet Take 1 tablet(s) by mouth daily  #90 (Ninety) tablet(s) Refills: 1         Lab Orders:       [id number]  PRSAS - [facility] PSA Screen or Medicare screening order:   (Send-Out)         59228  COMP - [facility] Comp. Metabolic Panel  (Send-Out)         23850  TSH - [facility] TSH  (Send-Out)           Other Orders:       1101F  Pt screen for fall risk; document no falls in past year or only 1 fall w/o injury in past year (SUSAN)  (In-House)           Depression screen negative  (In-House)                   PLAN:          Essential hypertension         RECOMMENDATIONS given include: Tolu is well today.  His recent pressures at home have been in fairly good ranges.  We will not anticipate changes, but we will see what his vitals look like here.  Otherwise, I'm going to refill his medications and arrange labs as noted.  Risk of missing colon cancer bluntly discussed.  We will see what his labs show and then be back in touch with him..  MIPS PHQ-9 Depression Screening: Completed form scanned and in chart; Total Score 0; Negative Depression Screen           Prescriptions:       Refill of: Losartan 100mg Tablet Take 1 tablet(s) by mouth daily  #90 (Ninety) tablet(s) Refills: 1       Refill of: Amlodipine  10mg Tablet Take 1 tablet(s) by mouth daily  #90 (Ninety) tablet(s) Refills: 1       Refill of: Triamterene/Hydrochlorothiazide 37.5mg/25mg Tablet Take 1 tablet(s) by mouth daily  #90 (Ninety) tablet(s) Refills: 1           Orders:       1101F  Pt screen for fall risk; document no falls in past year or only 1 fall w/o injury in past year (SUSAN)  (In-House)           Depression screen negative  (In-House)             Patient Education Handouts:       High Blood Pressure (HTN)           Hypercholesterolemia     LABORATORY:  Labs ordered to be performed today include  Comprehensive metabolic panel and TSH.            Prescriptions:       Refill of: Atorvastatin Calcium 20mg Tablet Take 1 tablet(s) by mouth daily  #90 (Ninety) tablet(s) Refills: 1           Orders:       [id number]  COMP - [facility] Comp. Metabolic Panel  (Send-Out)         88258  TSH - [facility] TSH  (Send-Out)            Screening for cancer of colon As above.          Screening for prostate cancer           Orders:       [id number]  [facility] PSA Screen or Medicare screening order:   (Send-Out)               CHARGE CAPTURE           **Please note: ICD descriptions below are intended for billing purposes only and may not represent clinical diagnoses**        Primary Diagnosis:         401.1 Essential hypertension            I10    Essential (primary) hypertension              Orders:          [id number]   Office/outpatient visit; established patient, level 4  (In-House)             1101F   Pt screen for fall risk; document no falls in past year or only 1 fall w/o injury in past year (SUSAN)  (In-House)                Depression screen negative  (In-House)           272.0 Hypercholesterolemia            E78.2    Mixed hyperlipidemia    V76.51 Screening for cancer of colon            Z12.11    Encounter for screening for malignant neoplasm of colon    V76.44 Screening for prostate cancer            Z12.5    Encounter for screening for malignant neoplasm of prostate

## 2021-05-18 NOTE — PROGRESS NOTES
RamirezEliud  1950     Office/Outpatient Visit    Visit Date: Thu, May 21, 2020 10:32 am    Provider: Franky Brenner MD     Location: [facility]        Electronically signed by Franky Brenner MD on  05/26/2020 08:21:19 AM                             Subjective:        CC: leg swelling    HPI:       Tolu is being seen today for follow up on edema.  He has had a significant problem develop since we stopped the triamterene/HCTZ last month.  He did have a significant elevation then of his kidney functions.  He has more of an issue with the right leg.  He has some discomfort as well.  He says that it feels tight.  It is not as swollen today as it usually is.  He has some mild redness as well.  He does take amlodipine in the mornings.          Essential (primary) hypertension details; his current cardiac medication regimen includes a calcium channel blocker ( Norvasc ) and an angiotensin receptor blocker ( Cozaar ).  Review of his blood pressure log reveals systolics in the 120-140 range.  He is tolerating the medication well without side effects.  Compliance with treatment has been good; he takes his medication as directed and follows up as directed.      ROS:     CONSTITUTIONAL:  Negative for chills and fever.      CARDIOVASCULAR:  Negative for chest pain and palpitations.      RESPIRATORY:  Negative for recent cough and dyspnea.      GASTROINTESTINAL:  Negative for abdominal pain, nausea and vomiting.      INTEGUMENTARY:  Negative for atypical mole(s) and rash.          Past Medical History / Family History / Social History:         Last Reviewed on 5/21/2020 10:33 AM by Franky Brenner    Past Medical History:             PAST MEDICAL HISTORY         Hyperlipidemia: Hypercholesterolemia;     Hypertension         PREVENTIVE HEALTH MAINTENANCE             COLORECTAL CANCER SCREENING: Up to date (colonoscopy q10y; sigmoidoscopy q5y; Cologuard q3y) was last done 06/2018,  Results are in chart; Cologuard result abnormal - patient declined colonoscopy referral     EYE EXAM: was last done maybe 2010         PAST MEDICAL HISTORY             CURRENT MEDICAL PROVIDERS:    Ophthalmologist: unknown name             ADVANCED DIRECTIVES: None         Surgical History:     NONE         Family History:         Positive for Hyperlipidemia ( mother ).          Social History:     Occupation:    Retired     Children: 2 children         Tobacco/Alcohol/Supplements:     Last Reviewed on 5/21/2020 10:33 AM by Franky Brenner    Tobacco: He has a past history of cigarette smoking; quit date:  1980s.          Alcohol: Frequency:    'none to speak of';         Substance Abuse History:     Last Reviewed on 5/21/2020 10:33 AM by Franky Brenner        Mental Health History:     Last Reviewed on 5/21/2020 10:33 AM by Franky Brenner        Communicable Diseases (eg STDs):     Last Reviewed on 5/21/2020 10:33 AM by Franky Brenner        Current Problems:     Last Reviewed on 5/21/2020 10:33 AM by Franky Brenner    Mixed hyperlipidemia    Essential (primary) hypertension    Body mass index (BMI) 34.0-34.9, adult    Other fatigue    Shortness of breath    Other viral pneumonia    Acute kidney failure, unspecified        Immunizations:     Prevnar 13 (Pneumococcal PCV 13) 3/30/2017    PNEUMOVAX 23 (Pneumococcal PPV23) 1/10/2019        Allergies:     Last Reviewed on 5/21/2020 10:33 AM by Franky Brenner    No Known Allergies.        Current Medications:     Last Reviewed on 5/21/2020 10:33 AM by Franky Brenner    aspirin 81 mg oral tablet, delayed release (enteric coated) [1 tab daily]    Multivitamins     amLODIPine 10 mg oral tablet [Take 1 tablet by mouth once daily]    triamterene-hydrochlorothiazid 37.5-25 mg oral tablet [Take 1 tablet by mouth once daily]    losartan 100 mg oral tablet [Take 1 tablet by mouth once daily]    atorvastatin 20 mg oral tablet  [Take 1 tablet by mouth once daily]    Zithromax 250 mg oral tablet [take 2 tablets (500 mg) by oral route once daily for 1 day then 1 tablet (250 mg) by oral route once daily for 4 days]        Objective:        Vitals:         Current: 5/21/2020 10:33:46 AM    Ht:  5 ft, 9.5 in;  Wt: 237 lbs;  BMI: 34.5T: 97.8 F (oral);  BP: 146/62 mm Hg (left arm, sitting);  P: 63 bpm (left arm (BP Cuff), sitting);  sCr: 1.09 mg/dL;  GFR: 67.70        Exams:     PHYSICAL EXAM:     GENERAL: Vitals recorded well developed, well nourished;     NECK: range of motion is normal; thyroid is non-palpable;     RESPIRATORY: normal respiratory rate and pattern with no distress; normal breath sounds with no rales, rhonchi, wheezes or rubs;     CARDIOVASCULAR: normal rate; rhythm is regular;  no systolic murmur; 3+ pedal edema;     GASTROINTESTINAL: nontender; normal bowel sounds; no masses;     LYMPHATIC: no enlargement of cervical or facial nodes; no supraclavicular nodes;     SKIN: there is some redness and tightness of the R lower extremity in particular with discomfort;     NEUROLOGIC: mental status: alert and oriented x 3; cranial nerves II-XII grossly intact;     PSYCHIATRIC: appropriate affect and demeanor; normal psychomotor function;         Assessment:         R60.0   Localized edema       I10   Essential (primary) hypertension       N17.9   Acute kidney failure, unspecified       M79.606   Pain in leg, unspecified           ORDERS:         Radiology/Test Orders:       [id number]  Duplex scan of extremity veins w/responses to compression and other maneuvers; complete study  (Send-Out; Stat)    BILATERAL LOWER EXTREMITIES                  Plan:         Localized edema        RECOMMENDATIONS given include: He has very significant edema.  I'm sure this is in part from having stopped the diuretic.  We will ask him to resume triamterene/HCTZ for the near term.  However, I do want to rule out obvious blood clot in this R leg in particular.   We will arrange venous doppler as a precaution given the edema and redness.  I think this is all fluid.  I have encouraged Tolu to work on decreased salt intake and gradual weight loss.  Also, I am concerned about him taking amlodipine in the mornings.  I want him to change it to night time dosing.  Tomorrow, I'd like him to take 1/2 tablet in the morning and 1/2 tablet in the evening, and then move to a whole tablet in the evening on Saturday..          Essential (primary) hypertensionAs above.        Acute kidney failure, unspecifiedAs above.        Pain in leg, unspecified        RADIOLOGY:  I have ordered Venous Doppler Bilateral to be done today.            Orders:       [id number]  Duplex scan of extremity veins w/responses to compression and other maneuvers; complete study  (Send-Out; Stat)    BILATERAL LOWER EXTREMITIES              Charge Capture:         Primary Diagnosis:     R60.0  Localized edema           Orders:      [id number]  Office/outpatient visit; established patient, level 4  (In-House)              I10  Essential (primary) hypertension     N17.9  Acute kidney failure, unspecified     M79.606  Pain in leg, unspecified

## 2021-05-18 NOTE — PROGRESS NOTES
Eliud Ramirez 1950     Office/Outpatient Visit    Visit Date: Thu, Akhil 10, 2019 10:10 am    Provider: Franky Brenner MD (Assistant: Elizabeth Haas MA)    Location: [facility]        Electronically signed by Franky Brenner MD on  01/12/2019 07:17:22 AM                             SUBJECTIVE:        CC: blood pressure, cholesterol, cologuard follow up         HPI:         Patient to be evaluated for essential hypertension.  His current cardiac medication regimen includes a calcium channel blocker ( Norvasc ), an angiotensin receptor blocker ( Cozaar ), and a combination medication ( Dyazide ).  Review of his blood pressure log reveals systolics in the 130s.  He is tolerating the medication well without side effects.  Compliance with treatment has been good; he takes his medication as directed and follows up as directed.          Dx with hypercholesterolemia; current treatment includes Lipitor and a low cholesterol/low fat diet.  Compliance with treatment has been good; he takes his medication as directed and follows up as directed.  He denies experiencing any hypercholesterolemia related symptoms.          Tolu was noted to have a positive Cologuard in June of last year.  He has opted to NOT have colonoscopy done.  We have discussed this.     ROS:     CONSTITUTIONAL:  Negative for chills and fever.      CARDIOVASCULAR:  Negative for chest pain and palpitations.      RESPIRATORY:  Negative for recent cough and dyspnea.      GASTROINTESTINAL:  Negative for abdominal pain, nausea and vomiting.          PMH/FMH/SH:     Last Reviewed on 1/10/2019 10:24 AM by Franky Brenner    Past Medical History:             PAST MEDICAL HISTORY         Hyperlipidemia: Hypercholesterolemia;         PREVENTIVE HEALTH MAINTENANCE             COLORECTAL CANCER SCREENING: Up to date (colonoscopy q10y; sigmoidoscopy q5y; Cologuard q3y) was last done 06/2018, Results are in chart; Cologuard result abnormal  - patient declined colonoscopy referral     EYE EXAM: was last done maybe 2010         PAST MEDICAL HISTORY             CURRENT MEDICAL PROVIDERS:    Ophthalmologist: unknown name             ADVANCED DIRECTIVES: None         Surgical History:     NONE         Family History:         Positive for Hyperlipidemia ( mother ).          Social History:     Occupation:    Retired     Children: 2 children         Tobacco/Alcohol/Supplements:     Last Reviewed on 1/10/2019 10:24 AM by Franky Brenner    Tobacco: He has a past history of cigarette smoking; quit date:  1980s.          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         Current Medications:     Last Reviewed on 1/10/2019 10:24 AM by Franky Brenner    Triamterene/Hydrochlorothiazide 37.5mg/25mg Tablet Take 1 tablet(s) by mouth daily     Losartan 100mg Tablet Take 1 tablet(s) by mouth daily     Atorvastatin Calcium 20mg Tablet Take 1 tablet(s) by mouth daily     Amlodipine  10mg Tablet Take 1 tablet(s) by mouth daily     Multivitamins     Aspirin (ASA) 81mg Tablets, Enteric Coated 1 tab daily         OBJECTIVE:        Vitals:         Current: 1/10/2019  10:17:13 AM    Ht:  5 ft, 9.5 in;  Wt: 234.8 lbs;  BMI: 34.2    T: 98.3 F (oral);  BP: 169/75 mm Hg (left arm, sitting);  P: 62 bpm (left arm (BP Cuff), sitting);  sCr: 1.15 mg/dL;  GFR: 65.70        Repeat:     10:19:18 AM     BP:   147/79mm Hg (left arm, sitting)         Exams:     PHYSICAL EXAM:     GENERAL: Vitals recorded well developed,  moderately obese;     EYES: extraocular movements intact; conjunctiva and cornea are normal; PERRL;     E/N/T: EARS:  normal external auditory canals and tympanic membranes;  grossly normal hearing; OROPHARYNX:  normal mucosa, dentition, gingiva, and posterior pharynx;     NECK: range of motion is normal; thyroid is non-palpable;     RESPIRATORY: normal respiratory rate and pattern with no distress; normal breath sounds with no rales, rhonchi, wheezes or rubs;     CARDIOVASCULAR: normal rate; rhythm is regular;  no systolic murmur;     GASTROINTESTINAL: nontender; normal bowel sounds; no masses;     LYMPHATIC: no enlargement of cervical or facial nodes; no supraclavicular nodes;     SKIN:  no significant rashes or lesions; no suspicious moles;     PSYCHIATRIC:  appropriate affect and demeanor; normal speech pattern; grossly normal memory;         Procedures:     Pneumovax administration     1. Pneumovax (pneumococcal PPSV23): 0.5 ml unit dose given IM in the left upper arm; administered by and;  lot number [id number]; expires 4/14/20             ASSESSMENT           401.1   I10  Essential hypertension              DDx:     272.0   E78.2  Hypercholesterolemia              DDx:     V76.41   Z12.12  Screening for rectal cancer              DDx:     V03.82   Z23  Pneumovax administration              DDx:         ORDERS:         Meds Prescribed:       Refill of: Triamterene/Hydrochlorothiazide 37.5mg/25mg Tablet Take 1 tablet(s) by mouth daily  #90 (Ninety) tablet(s) Refills: 1       Refill of: Losartan 100mg Tablet Take 1 tablet(s) by mouth daily  #90 (Ninety) tablet(s) Refills: 1        Refill of: Atorvastatin Calcium 20mg Tablet Take 1 tablet(s) by mouth daily  #90 (Ninety) tablet(s) Refills: 1       Refill of: Amlodipine  10mg Tablet Take 1 tablet(s) by mouth daily  #90 (Ninety) tablet(s) Refills: 1         Lab Orders:       03903  HTNLP - [facility] CMP AND LIPID: [id number], 91570  (Send-Out)         40137  MG - [facility] Magnesium, Serum  (Send-Out)         25174  TSH - H TSH  (Send-Out)           Procedures Ordered:       [id number]  PNEUMOVAX 23  (In-House)           Other Orders:         Administration of pneumococcal vaccine (x1)                 PLAN:          Essential hypertension         RECOMMENDATIONS given include: Today, we have reviewed his care.  I'm going to refill his medications as noted and arrange labs.  We also discussed the positive cologuard result including the potential that it could reflect a precancerous polyp or colon cancer.  He declines referral presently.  We will contact him after the labs come back..            Prescriptions:       Refill of: Triamterene/Hydrochlorothiazide 37.5mg/25mg Tablet Take 1 tablet(s) by mouth daily  #90 (Ninety) tablet(s) Refills: 1       Refill of: Losartan 100mg Tablet Take 1 tablet(s) by mouth daily  #90 (Ninety) tablet(s) Refills: 1       Refill of: Amlodipine  10mg Tablet Take 1 tablet(s) by mouth daily  #90 (Ninety) tablet(s) Refills: 1           Patient Education Handouts:       High Blood Pressure (HTN)           Hypercholesterolemia     LABORATORY:  Labs ordered to be performed today include HTN/Lipid Panel: CMP, Lipid, Magnesium level, and TSH.            Prescriptions:       Refill of: Atorvastatin Calcium 20mg Tablet Take 1 tablet(s) by mouth daily  #90 (Ninety) tablet(s) Refills: 1           Orders:       69645  HTNLP - [facility] CMP AND LIPID: [id number], [id number]  (Send-Out)         73930  MG - [facility] Magnesium, Serum  (Send-Out)         12084  TSH - H TSH  (Send-Out)            Screening for rectal cancer As above.          Pneumovax  administration           Orders:       29900  PNEUMOVAX 23  (In-House)                     Administration of pneumococcal vaccine (x1)             CHARGE CAPTURE           **Please note: ICD descriptions below are intended for billing purposes only and may not represent clinical diagnoses**        Primary Diagnosis:         401.1 Essential hypertension            I10    Essential (primary) hypertension              Orders:          65752   Office/outpatient visit; established patient, level 4  (In-House)           272.0 Hypercholesterolemia            E78.2    Mixed hyperlipidemia    V76.41 Screening for rectal cancer            Z12.12    Encounter for screening for malignant neoplasm of rectum    V03.82 Pneumovax administration            Z23    Encounter for immunization              Orders:          08667   PNEUMOVAX 23  (In-House)                                           Administration of pneumococcal vaccine (x1)

## 2021-05-18 NOTE — PROGRESS NOTES
Eliud Ramirez  1950     Office/Outpatient Visit    Visit Date: Mon, Apr 13, 2020 10:50 am    Provider: Franky Brenner MD (Assistant: Jeanine Green MA)    Location: [facility]        Electronically signed by Franky Brenner MD on  04/15/2020 11:06:20 AM                             Subjective:        CC: fatigue    HPI:       Tolu is being seen today for follow up on fatigue.  He has been struggling for the last 6 weeks.  He is not sure what has caused this.  He has been struggling with sleeping too much.  He been sleeping most of the day.  He is not wanting to eat.  He was seen last week for this and did have the triamterene/HCTZ stopped.  His BUN and creatinine were both elevated as compared to his  baseline.  He is not aware of anything that may have caused this.    ROS:     CONSTITUTIONAL:  Negative for chills and fever.      EYES:  Negative for blurred vision and eye pain.      E/N/T:  Negative for nasal congestion and sore throat.      CARDIOVASCULAR:  Negative for chest pain and palpitations.      RESPIRATORY:  Negative for recent cough and dyspnea.      GASTROINTESTINAL:  Positive for anorexia.   Negative for abdominal pain, constipation, diarrhea, nausea or vomiting.      GENITOURINARY:  Negative for dysuria and hematuria.      MUSCULOSKELETAL:  Negative for myalgias.      NEUROLOGICAL:  Negative for paresthesias and weakness.      HEMATOLOGIC/LYMPHATIC:  Negative for easy bruising and excessive bleeding.      PSYCHIATRIC:  Negative for anxiety and depression.          Past Medical History / Family History / Social History:         Last Reviewed on 4/13/2020 11:36 AM by Franky Brenner    Past Medical History:             PAST MEDICAL HISTORY         Hyperlipidemia: Hypercholesterolemia;     Hypertension         PREVENTIVE HEALTH MAINTENANCE             COLORECTAL CANCER SCREENING: Up to date (colonoscopy q10y; sigmoidoscopy q5y; Cologuard q3y) was last done  06/2018, Results are in chart; Cologuard result abnormal - patient declined colonoscopy referral     EYE EXAM: was last done maybe 2010         PAST MEDICAL HISTORY             CURRENT MEDICAL PROVIDERS:    Ophthalmologist: unknown name             ADVANCED DIRECTIVES: None         Surgical History:     NONE         Family History:         Positive for Hyperlipidemia ( mother ).          Social History:     Occupation:    Retired     Children: 2 children         Tobacco/Alcohol/Supplements:     Last Reviewed on 4/13/2020 11:36 AM by Franky Brenner    Tobacco: He has a past history of cigarette smoking; quit date:  1980s.          Alcohol: Frequency:    'none to speak of';         Substance Abuse History:     Last Reviewed on 4/13/2020 11:36 AM by Franky Brenner        Mental Health History:     Last Reviewed on 4/13/2020 11:36 AM by Franky Brenner        Communicable Diseases (eg STDs):     Last Reviewed on 4/13/2020 11:36 AM by Franky Brenner        Current Problems:     Last Reviewed on 4/13/2020 11:36 AM by Franky Brenner    Mixed hyperlipidemia    Essential (primary) hypertension    Body mass index (BMI) 34.0-34.9, adult    Other fatigue    Shortness of breath        Immunizations:     Prevnar 13 (Pneumococcal PCV 13) 3/30/2017    PNEUMOVAX 23 (Pneumococcal PPV23) 1/10/2019        Allergies:     Last Reviewed on 4/13/2020 11:36 AM by Franky Brenner    No Known Allergies.        Current Medications:     Last Reviewed on 4/13/2020 11:36 AM by Franky Brenner    aspirin 81 mg oral tablet, delayed release (enteric coated) [1 tab daily]    Multivitamins     amLODIPine 10 mg oral tablet [Take 1 tablet by mouth once daily]    triamterene-hydrochlorothiazid 37.5-25 mg oral tablet [Take 1 tablet by mouth once daily]    Losartan 100 mg oral tablet [Take 1 tablet(s) by mouth daily]    atorvastatin 20 mg oral tablet [Take 1 tablet by mouth once daily]         "Objective:        Vitals:         Current: 4/13/2020 10:56:17 AM    Ht:  5 ft, 9.5 in;  Wt: 224.2 lbs;  BMI: 32.6T: 96.7 F (oral);  BP: 129/39 mm Hg (left arm, sitting);  P: 64 bpm (left arm (BP Cuff), sitting);  sCr: 1.88 mg/dL;  GFR: 38.87        Repeat:     10:57:2 AM  BP:   112/44mm Hg (left arm, sitting, HR: 65)     Exams:     PHYSICAL EXAM:     GENERAL: Vitals recorded well developed,  moderately obese;     EYES: extraocular movements intact; conjunctiva and cornea are normal; PERRL;     E/N/T: EARS:  normal external auditory canals and tympanic membranes;  grossly normal hearing; OROPHARYNX:  normal mucosa, dentition, gingiva, and posterior pharynx;     NECK: range of motion is normal; thyroid is non-palpable;     RESPIRATORY: normal respiratory rate and pattern with no distress; rales (\"crackles\") present in the bases;     CARDIOVASCULAR: normal rate; rhythm is regular;  no systolic murmur;     GASTROINTESTINAL: nontender; normal bowel sounds; no masses;     LYMPHATIC: no enlargement of cervical or facial nodes; no supraclavicular nodes;     SKIN:  no significant rashes or lesions; no suspicious moles;     NEUROLOGIC: mental status: alert and oriented x 3; cranial nerves II-XII grossly intact;     PSYCHIATRIC: affect/demeanor: flat;  normal psychomotor function;         Lab/Test Results:         LABORATORY RESULTS: EKG performed by EKG performed by AS         Assessment:         R53.83   Other fatigue       E78.2   Mixed hyperlipidemia       I10   Essential (primary) hypertension       R06.02   Shortness of breath           ORDERS:         Radiology/Test Orders:       [id number]  Radiologic exam chest 2 views  (Send-Out)            92887  Electrocardiogram, routine with at least 12 leads; with interpretation and report  (In-House)              Lab Orders:       07799  NTBNP - [facility] B-Type Natriurectic peptide  (Send-Out)            22861  BDCB2 - [facility] CBC w/o diff  (Send-Out)            88276  CK - [facility]- CK total  " (Send-Out)            60328  COMP - [facility] Comp. Metabolic Panel  (Send-Out)            87711  LYSCR - [facility] Lyme Ab/Western Blot Reflex  (Send-Out)                      Plan:         Other fatigue    LABORATORY:  Labs ordered to be performed today include CBC W/O DIFF, CK, total, Comprehensive metabolic panel, and Lyme Ab/Western Blot Reflex.      RECOMMENDATIONS given include: I'm concerned about Tolu's presentation.  We will update labs and testing as noted.  He does have some crackles at both lung bases.  I'm not sure what to make of that, but we will get a CXR and BNP today.  We will follow closely.  Consider sleep testing.  We will keep him off work for this week at least.  Tentative return to work on 4/20/20..            Orders:       65260  BDCB2 - [facility] CBC w/o diff  (Send-Out)            45741  CK - [facility]- CK total  (Send-Out)            70195  COMP - [facility] Comp. Metabolic Panel  (Send-Out)            25415  LYSCR - [facility] Lyme Ab/Western Blot Reflex  (Send-Out)              Mixed hyperlipidemiaAs above.        Essential (primary) hypertensionAs above.        Shortness of breath    LABORATORY:  Labs ordered to be performed today include BNP.      RADIOLOGY:  I have ordered a chest x-ray (PA and lateral) to be done today.      TESTS/PROCEDURES:  Will proceed with an ECG to be performed/scheduled now.            Orders:       31671  NTBNP - [facility] B-Type Natriurectic peptide  (Send-Out)            74385  Radiologic exam chest 2 views  (Send-Out)            24836  Electrocardiogram, routine with at least 12 leads; with interpretation and report  (In-House)                  Charge Capture:         Primary Diagnosis:     R53.83  Other fatigue           Orders:      66195  Office/outpatient visit; established patient, level 4  (In-House)              E78.2  Mixed hyperlipidemia     I10  Essential (primary) hypertension     R06.02  Shortness of breath           Orders:      38587  Electrocardiogram, routine with at least 12 leads;  with interpretation and report  (In-House)

## 2021-05-18 NOTE — PROGRESS NOTES
Eliud Ramirez  1950     Office/Outpatient Visit    Visit Date: Thu, Apr 22, 2021 09:55 am    Provider: Franky Brenner MD (Assistant: Laurence Jiménez RN)    Location: [facility]        Electronically signed by Franky Brenner MD on  04/24/2021 09:15:44 AM                             Subjective:        CC: blood pressure, cholesterol, edema    HPI:           Tolu presents with essential (primary) hypertension.  His current cardiac medication regimen includes a calcium channel blocker ( Norvasc ), an angiotensin receptor blocker ( Cozaar ), and a combination medication ( Dyazide ).  Review of his blood pressure log reveals systolics in the 120-140 range.  He is tolerating the medication well without side effects.  Compliance with treatment has been good; he takes his medication as directed and follows up as directed.            Additionally, he presents with history of mixed hyperlipidemia.  current treatment includes Lipitor and a low cholesterol/low fat diet.  Compliance with treatment has been good; he takes his medication as directed and follows up as directed.  He denies experiencing any hypercholesterolemia related symptoms.            Tolu continues to have issues with edema.  This has been more of a chronic issue in the last bit.  He does take amlodipine and has been on that for some time.    ROS:     CONSTITUTIONAL:  Negative for chills and fever.      CARDIOVASCULAR:  Negative for chest pain and palpitations.      RESPIRATORY:  Negative for recent cough and dyspnea.      GASTROINTESTINAL:  Negative for abdominal pain, nausea and vomiting.      INTEGUMENTARY:  Negative for atypical mole(s) and rash.          Past Medical History / Family History / Social History:         Last Reviewed on 4/22/2021 10:12 AM by Franky Brenner    Past Medical History:             PAST MEDICAL HISTORY         Hyperlipidemia: Hypercholesterolemia;     Hypertension         PREVENTIVE  HEALTH MAINTENANCE             COLORECTAL CANCER SCREENING: Up to date (colonoscopy q10y; sigmoidoscopy q5y; Cologuard q3y) was last done 06/2018, Results are in chart; Cologuard result abnormal - patient declined colonoscopy referral     EYE EXAM: was last done maybe 2010         PAST MEDICAL HISTORY             CURRENT MEDICAL PROVIDERS:    Ophthalmologist: unknown name             ADVANCED DIRECTIVES: None         Surgical History:     NONE         Family History:         Positive for Hyperlipidemia ( mother ).          Social History:     Occupation:    Retired     Children: 2 children         Tobacco/Alcohol/Supplements:     Last Reviewed on 4/22/2021 10:12 AM by Franky Brenner    Tobacco: He has a past history of cigarette smoking; quit date:  1980s.          Alcohol: Frequency:    'none to speak of';         Substance Abuse History:     Last Reviewed on 4/22/2021 10:12 AM by Franky Brenner        Mental Health History:     Last Reviewed on 4/22/2021 10:12 AM by Franky Brenner        Communicable Diseases (eg STDs):     Last Reviewed on 4/22/2021 10:12 AM by Franky Brenner        Current Problems:     Last Reviewed on 4/22/2021 10:12 AM by Franky Brenner    Mixed hyperlipidemia    Essential (primary) hypertension    Body mass index (BMI) 34.0-34.9, adult    Venous insufficiency (chronic) (peripheral)    Localized edema        Immunizations:     Prevnar 13 (Pneumococcal PCV 13) 3/30/2017    PNEUMOVAX 23 (Pneumococcal PPV23) 1/10/2019        Allergies:     Last Reviewed on 4/22/2021 10:12 AM by Franky Brenner    No Known Allergies.        Current Medications:     Last Reviewed on 4/22/2021 10:12 AM by Franky Brenner    aspirin 81 mg oral tablet, delayed release (enteric coated) [1 tab daily]    Multivitamins     amLODIPine 10 mg oral tablet [Take 1 tablet by mouth once daily]    triamterene-hydrochlorothiazid 37.5-25 mg oral tablet [Take 1 tablet by mouth  once daily]    losartan 50 mg oral tablet [Take 1 tablet by mouth once daily]    atorvastatin 20 mg oral tablet [Take 1 tablet by mouth once daily]    sildenafiL 100 mg oral tablet [take 1/2 to 1 tablet by oral route once daily as needed approximately 1 hour before sexual activity]        Objective:        Vitals:         Current: 4/22/2021 9:58:12 AM    Ht:  5 ft, 9.5 in;  Wt: 235.4 lbs;  BMI: 34.3T: 96.8 F (temporal);  BP: 150/58 mm Hg (right arm, sitting);  P: 58 bpm (right arm (BP Cuff), sitting);  sCr: 1.19 mg/dL;  GFR: 60.97        Repeat:     10:25:49 AM  BP:   145/62mm Hg (right arm, sitting, HR: 59)     Exams:     PHYSICAL EXAM:     GENERAL: Vitals recorded well developed, obese;     EYES: extraocular movements intact; conjunctiva and cornea are normal; PERRL;     NECK: range of motion is normal; thyroid is non-palpable;     RESPIRATORY: normal respiratory rate and pattern with no distress; normal breath sounds with no rales, rhonchi, wheezes or rubs;     CARDIOVASCULAR: normal rate; rhythm is regular;  no systolic murmur; 1+ pedal edema;     GASTROINTESTINAL: nontender; normal bowel sounds; no masses;     LYMPHATIC: no enlargement of cervical or facial nodes; no supraclavicular nodes;     SKIN:  no significant rashes or lesions; no suspicious moles;     NEUROLOGIC: mental status: alert and oriented x 3; cranial nerves II-XII grossly intact;     PSYCHIATRIC: appropriate affect and demeanor; normal psychomotor function;         Assessment:         I10   Essential (primary) hypertension       E78.2   Mixed hyperlipidemia       R60.0   Localized edema       Z12.5   Encounter for screening for malignant neoplasm of prostate           ORDERS:         Meds Prescribed:       [Refilled] losartan 50 mg oral tablet [Take 1 tablet by mouth once daily], #90 (ninety) tablets, Refills: 1 (one)       [Refilled] triamterene-hydrochlorothiazid 37.5-25 mg oral tablet [Take 1 tablet by mouth once daily], #90 (ninety) tablets,  Refills: 1 (one)       [Refilled] atorvastatin 20 mg oral tablet [take 1 tablet (20 mg) by oral route once daily at bedtime], #90 (ninety) tablets, Refills: 1 (one)       [Refilled] NIFEdipine 60 mg oral Tablet, Extended Release 24 hr [take 1 tablet (60 mg) by oral route once daily at bedtime], #90 (ninety) tablets, Refills: 1 (one)         Lab Orders:       [id number]  [facility] Comp. Metabolic Panel  (Send-Out)            [id number]  TSH - [facility] TSH  (Send-Out)            [id number]  PRSAS - [facility] PSA Screen or Medicare screening order:   (Send-Out)                      Plan:         Essential (primary) hypertension    LABORATORY:  Labs ordered to be performed today include Comprehensive metabolic panel.      RECOMMENDATIONS given include: Tolu is well today, but the edema is an ongoing issue.  There is not evidence of worrisome issue.  However, I am going to change him from amlodipine to nifedipine.  We will update labs as noted and be back in touch with him.  He will monitor the pressures..            Prescriptions:       [Refilled] losartan 50 mg oral tablet [Take 1 tablet by mouth once daily], #90 (ninety) tablets, Refills: 1 (one)       [Refilled] triamterene-hydrochlorothiazid 37.5-25 mg oral tablet [Take 1 tablet by mouth once daily], #90 (ninety) tablets, Refills: 1 (one)       [Refilled] atorvastatin 20 mg oral tablet [take 1 tablet (20 mg) by oral route once daily at bedtime], #90 (ninety) tablets, Refills: 1 (one)       [Refilled] NIFEdipine 60 mg oral Tablet, Extended Release 24 hr [take 1 tablet (60 mg) by oral route once daily at bedtime], #90 (ninety) tablets, Refills: 1 (one)           Orders:       [id number]  [facility] Comp. Metabolic Panel  (Send-Out)              Mixed hyperlipidemiaAs above.    LABORATORY:  Labs ordered to be performed today include TSH.            Orders:       [id number]  TSH [facility] TSH  (Send-Out)              Localized edemaAs above.        Encounter for screening for malignant neoplasm of  prostate          Orders:       [id number]  [facility] PSA Screen or Medicare screening order:   (Send-Out)                  Charge Capture:         Primary Diagnosis:     I10  Essential (primary) hypertension           Orders:      [id number]  Office/outpatient visit; established patient, level 4  (In-House)              E78.2  Mixed hyperlipidemia     R60.0  Localized edema     Z12.5  Encounter for screening for malignant neoplasm of prostate

## 2021-05-18 NOTE — PROGRESS NOTES
Eliud Ramirez 1950     Office/Outpatient Visit    Visit Date: Thu, May 24, 2018 10:33 am    Provider: Franky Brenner MD (Assistant: Edwina Ludwig MA)    Location: [facility]        Electronically signed by Franky Brenner MD on  05/25/2018 05:18:04 AM                             SUBJECTIVE:        CC: blood pressure, cholesterol, prostate         HPI:         Patient presents with essential hypertension.  His current cardiac medication regimen includes a calcium channel blocker ( Norvasc ), an angiotensin receptor blocker ( Cozaar ), and a combination medication ( Dyazide ).  Review of his blood pressure log reveals systolics in the 130s.  He is tolerating the medication well without side effects.  Compliance with treatment has been good; he takes his medication as directed and follows up as directed.          With regard to the hypercholesterolemia, current treatment includes Zocor and a low cholesterol/low fat diet.  Compliance with treatment has been good; he takes his medication as directed and follows up as directed.  He denies experiencing any hypercholesterolemia related symptoms.      ROS:     CONSTITUTIONAL:  Negative for chills and fever.      CARDIOVASCULAR:  Negative for chest pain and palpitations.      RESPIRATORY:  Negative for recent cough and dyspnea.      GASTROINTESTINAL:  Negative for abdominal pain, nausea and vomiting.          PMH/FMH/SH:     Last Reviewed on 11/30/2017 09:37 AM by Franky Brenner    Past Medical History:             PAST MEDICAL HISTORY         Hyperlipidemia: Hypercholesterolemia;         PREVENTIVE HEALTH MAINTENANCE             COLORECTAL CANCER SCREENING: declines colorectal cancer screening, understands reason for testing     EYE EXAM: was last done maybe 2010         PAST MEDICAL HISTORY             CURRENT MEDICAL PROVIDERS:    Ophthalmologist: unknown name             ADVANCED DIRECTIVES: None         Surgical History:     NONE          Family History:         Positive for Hyperlipidemia ( mother ).          Social History:     Occupation:    Retired     Children: 2 children         Tobacco/Alcohol/Supplements:     Last Reviewed on 11/30/2017 09:37 AM by Franky Brenner    Tobacco: He has a past history of cigarette smoking; quit date:  1980s.          Alcohol: Frequency:    'none to speak of';         Substance Abuse History:     Last Reviewed on 11/30/2017 09:37 AM by Franky Brenner        Mental Health History:     Last Reviewed on 11/30/2017 09:37 AM by Franky Brenner        Communicable Diseases (eg STDs):     Last Reviewed on 11/30/2017 09:37 AM by Franky Brenner            Current Problems:     Last Reviewed on 11/30/2017 09:37 AM by Franky Brenner    Erectile dysfunction due to organic reasons     Central obesity     Essential hypertension     Hypercholesterolemia     Other specified disorders of thyroid         Immunizations:     Prevnar 13 (Pneumococcal PCV 13) 3/30/2017         Allergies:     Last Reviewed on 11/30/2017 09:37 AM by Franky Brenner      No Known Drug Allergies.         Current Medications:     Last Reviewed on 11/30/2017 09:37 AM by Franky Brenner    Amlodipine  10mg Tablet Take 1 tablet(s) by mouth daily     Simvastatin 20mg Tablet Take 1 tablet(s) by mouth at bedtime     Triamterene/Hydrochlorothiazide 37.5mg/25mg Tablet Take 1 tablet(s) by mouth daily     Losartan 100mg Tablet Take 1 tablet(s) by mouth daily     Aspirin (ASA) 81mg Tablets, Enteric Coated 1 tab daily         OBJECTIVE:        Vitals:         Current: 5/24/2018 10:35:09 AM    Ht:  5 ft, 9.5 in;  Wt: 237.9 lbs;  BMI: 34.6    T: 98.1 F (oral);  BP: 129/66 mm Hg (left arm, sitting);  P: 62 bpm (left arm (BP Cuff), sitting);  sCr: 1.08 mg/dL;  GFR: 70.35        Exams:     PHYSICAL EXAM:     GENERAL: Vitals recorded well developed, well nourished;     EYES: extraocular movements intact; conjunctiva and  cornea are normal; PERRL;     E/N/T: EARS:  normal external auditory canals and tympanic membranes;  grossly normal hearing; OROPHARYNX:  normal mucosa, dentition, gingiva, and posterior pharynx;     NECK: range of motion is normal; thyroid is non-palpable;     RESPIRATORY: normal respiratory rate and pattern with no distress; normal breath sounds with no rales, rhonchi, wheezes or rubs;     CARDIOVASCULAR: normal rate; rhythm is regular;  no systolic murmur;     GASTROINTESTINAL: nontender; normal bowel sounds; no masses; rectal exam: normal tone; nontender, guaiac negative stool;     GENITOURINARY: prostate:  no nodules, tenderness, or enlargement;     SKIN:  no significant rashes or lesions; no suspicious moles;         ASSESSMENT           401.1   I10  Essential hypertension              DDx:     272.0   E78.4  Hypercholesterolemia              DDx:     V76.44   Z12.5  Screening for prostate cancer              DDx:     V76.41   Z12.12  Screening for rectal cancer              DDx:         ORDERS:         Meds Prescribed:       Refill of: Amlodipine  10mg Tablet Take 1 tablet(s) by mouth daily  #90 (Ninety) tablet(s) Refills: 1       Refill of: Triamterene/Hydrochlorothiazide 37.5mg/25mg Tablet Take 1 tablet(s) by mouth daily  #90 (Ninety) tablet(s) Refills: 1       Refill of: Losartan 100mg Tablet Take 1 tablet(s) by mouth daily  #90 (Ninety) tablet(s) Refills: 1       Atorvastatin Calcium 20mg Tablet Take 1 tablet(s) by mouth daily  #90 (Ninety) tablet(s) Refills: 1         Lab Orders:       [id number]  Oncology colorectal screening yumiko 10 DNA markers  (Send-Out)         [id number]  PRSAS - [facility] PSA Screen or Medicare screening order:   (Send-Out)         [id number]  HTNLP - [facility] CMP AND LIPID: [id number]  (Send-Out)         [id number]  TSH - [facility] TSH  (Send-Out)         [id number]  CK - [facility]- CK total  (Send-Out)                   PLAN:          Essential hypertension         RECOMMENDATIONS given include: Tolu is doing well  overall.  We will refill his medications as noted below.  I am going to recommend a change to atorvastatin given the potential for an interaction between simvastatin and amlodipine.  If he has any problem with the change, he will let us know.  Otherwise, we discussed possibly getting sleep testing, but he would like to think about that.  We will see about possibly getting Cologuard..            Prescriptions:       Refill of: Amlodipine  10mg Tablet Take 1 tablet(s) by mouth daily  #90 (Ninety) tablet(s) Refills: 1       Refill of: Triamterene/Hydrochlorothiazide 37.5mg/25mg Tablet Take 1 tablet(s) by mouth daily  #90 (Ninety) tablet(s) Refills: 1       Refill of: Losartan 100mg Tablet Take 1 tablet(s) by mouth daily  #90 (Ninety) tablet(s) Refills: 1           Patient Education Handouts:       [facility] [place]           Hypercholesterolemia     LABORATORY:  Labs ordered to be performed today include CK, total, HTN/Lipid Panel: CMP, Lipid, and TSH.            Prescriptions:       Atorvastatin Calcium 20mg Tablet Take 1 tablet(s) by mouth daily  #90 (Ninety) tablet(s) Refills: 1           Orders:       [id number]  HTNLP - [facility] CMP AND LIPID: [id number]  (Send-Out)         14885  TSH - [facility] TSH  (Send-Out)         [id number]  CK - [facility]- CK total  (Send-Out)            Screening for prostate cancer           Orders:       [id number]  PRSAS - [facility] PSA Screen or Medicare screening order:   (Send-Out)            Screening for rectal cancer         TESTS/PROCEDURES:  Will proceed with Cologuard to be performed/scheduled now.            Orders:       [id number]  Oncology colorectal screening yumiko 10 DNA markers  (Send-Out)               CHARGE CAPTURE           **Please note: ICD descriptions below are intended for billing purposes only and may not represent clinical diagnoses**        Primary Diagnosis:         401.1 Essential hypertension            I10    Essential (primary) hypertension              Orders:          [id number]    Office/outpatient visit; established patient, level 4  (In-House)           272.0 Hypercholesterolemia            E78.4    Other hyperlipidemia    V76.44 Screening for prostate cancer            Z12.5    Encounter for screening for malignant neoplasm of prostate    V76.41 Screening for rectal cancer            Z12.12    Encounter for screening for malignant neoplasm of rectum

## 2021-05-24 ENCOUNTER — OFFICE VISIT CONVERTED (OUTPATIENT)
Dept: FAMILY MEDICINE CLINIC | Age: 71
End: 2021-05-24
Attending: FAMILY MEDICINE

## 2021-06-05 NOTE — PROGRESS NOTES
Eliud Ramirez  1950     Office/Outpatient Visit    Visit Date: Mon, May 24, 2021 11:43 am    Provider: Franky Brenner MD (Assistant: Laurence Jiménez RN)    Location: [facility]        Electronically signed by Franky Brenner MD on  05/26/2021 08:17:08 AM                             Subjective:        CC: follow up on my legs    HPI:       Tolu is back today for follow up on edema.  Please see his last visit.  He has been using some Copper Fit compression and seen improvement in the swelling.  We also changed him over to nifedipine.  He did not see improvement after changing from amlodipine.  He is not having significant pain in the legs.            Dx with essential (primary) hypertension; his current cardiac medication regimen includes a calcium channel blocker ( Procardia XL ), an angiotensin receptor blocker ( Cozaar ), and a combination medication ( Dyazide ).  Review of his blood pressure log reveals systolics in the 120-140 range.  He is tolerating the medication well without side effects.  Compliance with treatment has been good; he takes his medication as directed and follows up as directed.        Tolu has used some sildenafil for ED issues, but it is not that helpful.    ROS:     CONSTITUTIONAL:  Negative for chills and fever.      CARDIOVASCULAR:  Negative for chest pain and palpitations.      RESPIRATORY:  Negative for recent cough and dyspnea.      GASTROINTESTINAL:  Negative for abdominal pain, nausea and vomiting.      INTEGUMENTARY:  Negative for atypical mole(s) and rash.          Past Medical History / Family History / Social History:         Last Reviewed on 4/22/2021 10:12 AM by Franky Brenner    Past Medical History:             PAST MEDICAL HISTORY         Hyperlipidemia: Hypercholesterolemia;     Hypertension         PREVENTIVE HEALTH MAINTENANCE             COLORECTAL CANCER SCREENING: Up to date (colonoscopy q10y; sigmoidoscopy q5y; Cologuard  q3y) was last done 06/2018, Results are in chart; Cologuard result abnormal - patient declined colonoscopy referral     EYE EXAM: was last done maybe 2010         PAST MEDICAL HISTORY             CURRENT MEDICAL PROVIDERS:    Ophthalmologist: unknown name             ADVANCED DIRECTIVES: None         Surgical History:     NONE         Family History:         Positive for Hyperlipidemia ( mother ).          Social History:     Occupation:    Retired     Children: 2 children         Tobacco/Alcohol/Supplements:     Last Reviewed on 4/22/2021 10:12 AM by Franky Brenner    Tobacco: He has a past history of cigarette smoking; quit date:  1980s.          Alcohol: Frequency:    'none to speak of';         Substance Abuse History:     Last Reviewed on 4/22/2021 10:12 AM by Franky Brenner        Mental Health History:     Last Reviewed on 4/22/2021 10:12 AM by Franky Brenner        Communicable Diseases (eg STDs):     Last Reviewed on 4/22/2021 10:12 AM by Franky Brenner        Current Problems:     Last Reviewed on 4/22/2021 10:12 AM by Franky Brenner    Mixed hyperlipidemia    Essential (primary) hypertension    Body mass index (BMI) 34.0-34.9, adult    Encounter for screening for malignant neoplasm of prostate    Localized edema    Venous insufficiency (chronic) (peripheral)        Immunizations:     Prevnar 13 (Pneumococcal PCV 13) 3/30/2017    PNEUMOVAX 23 (Pneumococcal PPV23) 1/10/2019        Allergies:     Last Reviewed on 4/22/2021 10:12 AM by Franky Brenner    No Known Allergies.        Current Medications:     Last Reviewed on 4/22/2021 10:12 AM by Franky Brenner    aspirin 81 mg oral tablet, delayed release (enteric coated) [1 tab daily]    Multivitamins     NIFEdipine 60 mg oral Tablet, Extended Release 24 hr [take 1 tablet (60 mg) by oral route once daily at bedtime]    triamterene-hydrochlorothiazid 37.5-25 mg oral tablet [Take 1 tablet by mouth once  daily]    losartan 50 mg oral tablet [Take 1 tablet by mouth once daily]    atorvastatin 20 mg oral tablet [take 1 tablet (20 mg) by oral route once daily at bedtime]        Objective:        Vitals:         Current: 5/24/2021 11:46:31 AM    Ht:  5 ft, 9.5 in;  Wt: 228.8 lbs;  BMI: 33.3T: 97.9 F (temporal);  BP: 149/53 mm Hg (right arm, sitting);  P: 66 bpm (right arm (BP Cuff), sitting);  sCr: 1.03 mg/dL;  GFR: 69.59        Exams:     PHYSICAL EXAM:     GENERAL: Vitals recorded well developed,  moderately obese;     NECK: range of motion is normal; thyroid is non-palpable;     RESPIRATORY: normal respiratory rate and pattern with no distress; normal breath sounds with no rales, rhonchi, wheezes or rubs;     CARDIOVASCULAR: normal rate; rhythm is regular;  no systolic murmur; trace pedal edema;     GASTROINTESTINAL: nontender; normal bowel sounds; no masses;     LYMPHATIC: no enlargement of cervical or facial nodes; no supraclavicular nodes;     SKIN:  no significant rashes or lesions; no suspicious moles;     NEUROLOGIC: mental status: alert and oriented x 3; cranial nerves II-XII grossly intact;     PSYCHIATRIC: appropriate affect and demeanor; normal psychomotor function;         Assessment:         R60.0   Localized edema       I10   Essential (primary) hypertension       N52.9   Male erectile dysfunction, unspecified           ORDERS:         Meds Prescribed:       [New Rx] tadalafiL 20 mg oral tablet [take 1 tablet (20 mg) by oral route as needed approximately 1 hour before sexual activity], #30 (thirty) tablets, Refills: 2 (two)                 Plan:         Localized edema        RECOMMENDATIONS given include: Tolu has seen relative improvement in how he feels.  I am going to recommend no specific change.  I think he has some chronic venous insufficiency in the lower legs.  He is going to continue current medications for the time being.  His pressures at home have been in good ranges..          Essential  (primary) hypertensionAs above.        Male erectile dysfunction, unspecifiedWe will try some Cialis.          Prescriptions:       [New Rx] tadalafiL 20 mg oral tablet [take 1 tablet (20 mg) by oral route as needed approximately 1 hour before sexual activity], #30 (thirty) tablets, Refills: 2 (two)             Charge Capture:         Primary Diagnosis:     R60.0  Localized edema           Orders:      [id number]  Office/outpatient visit; established patient, level 3  (In-House)              I10  Essential (primary) hypertension     N52.9  Male erectile dysfunction, unspecified

## 2021-07-01 VITALS
WEIGHT: 237.9 LBS | HEIGHT: 70 IN | HEART RATE: 62 BPM | DIASTOLIC BLOOD PRESSURE: 66 MMHG | TEMPERATURE: 98.1 F | SYSTOLIC BLOOD PRESSURE: 129 MMHG | BODY MASS INDEX: 34.06 KG/M2

## 2021-07-01 VITALS
SYSTOLIC BLOOD PRESSURE: 128 MMHG | BODY MASS INDEX: 34.24 KG/M2 | WEIGHT: 239.2 LBS | DIASTOLIC BLOOD PRESSURE: 61 MMHG | TEMPERATURE: 98 F | HEART RATE: 63 BPM | HEIGHT: 70 IN

## 2021-07-01 VITALS
SYSTOLIC BLOOD PRESSURE: 146 MMHG | WEIGHT: 231.6 LBS | HEIGHT: 70 IN | DIASTOLIC BLOOD PRESSURE: 51 MMHG | BODY MASS INDEX: 33.16 KG/M2 | HEART RATE: 73 BPM | TEMPERATURE: 98.4 F

## 2021-07-01 VITALS
BODY MASS INDEX: 33.61 KG/M2 | WEIGHT: 234.8 LBS | SYSTOLIC BLOOD PRESSURE: 147 MMHG | HEIGHT: 70 IN | DIASTOLIC BLOOD PRESSURE: 79 MMHG | HEART RATE: 62 BPM | TEMPERATURE: 98.3 F

## 2021-07-02 VITALS
SYSTOLIC BLOOD PRESSURE: 149 MMHG | HEIGHT: 70 IN | BODY MASS INDEX: 32.75 KG/M2 | DIASTOLIC BLOOD PRESSURE: 53 MMHG | WEIGHT: 228.8 LBS | HEART RATE: 66 BPM | TEMPERATURE: 97.9 F

## 2021-07-02 VITALS
TEMPERATURE: 96.8 F | SYSTOLIC BLOOD PRESSURE: 145 MMHG | WEIGHT: 235.4 LBS | HEIGHT: 70 IN | BODY MASS INDEX: 33.7 KG/M2 | HEART RATE: 58 BPM | DIASTOLIC BLOOD PRESSURE: 62 MMHG

## 2021-07-02 VITALS
TEMPERATURE: 96.7 F | HEART RATE: 64 BPM | DIASTOLIC BLOOD PRESSURE: 56 MMHG | HEIGHT: 70 IN | BODY MASS INDEX: 32.64 KG/M2 | HEART RATE: 67 BPM | SYSTOLIC BLOOD PRESSURE: 112 MMHG | SYSTOLIC BLOOD PRESSURE: 123 MMHG | WEIGHT: 228 LBS | WEIGHT: 224.2 LBS | DIASTOLIC BLOOD PRESSURE: 44 MMHG | HEIGHT: 70 IN | BODY MASS INDEX: 32.1 KG/M2 | TEMPERATURE: 99.6 F

## 2021-07-02 VITALS
HEART RATE: 63 BPM | HEIGHT: 70 IN | SYSTOLIC BLOOD PRESSURE: 146 MMHG | TEMPERATURE: 97.8 F | WEIGHT: 237 LBS | BODY MASS INDEX: 33.93 KG/M2 | DIASTOLIC BLOOD PRESSURE: 62 MMHG

## 2021-07-02 VITALS
HEIGHT: 70 IN | WEIGHT: 233.2 LBS | TEMPERATURE: 97.9 F | BODY MASS INDEX: 33.39 KG/M2 | HEART RATE: 62 BPM | DIASTOLIC BLOOD PRESSURE: 63 MMHG | SYSTOLIC BLOOD PRESSURE: 149 MMHG

## 2021-08-26 ENCOUNTER — TELEPHONE (OUTPATIENT)
Dept: FAMILY MEDICINE CLINIC | Age: 71
End: 2021-08-26

## 2021-09-17 ENCOUNTER — TELEPHONE (OUTPATIENT)
Dept: FAMILY MEDICINE CLINIC | Age: 71
End: 2021-09-17

## 2021-09-17 DIAGNOSIS — Z12.11 SCREEN FOR COLON CANCER: Primary | ICD-10-CM

## 2021-09-17 NOTE — TELEPHONE ENCOUNTER
Pt inf re: tickle for Cologuadagmar, did not place order, as I am not sure what to order, he is also asking if he is due for ov or labs at this time. Please advise.

## 2021-09-18 NOTE — TELEPHONE ENCOUNTER
I placed an order for Marium.  It is on the referrals printer.  Go ahead and schedule him for follow-up visit in the middle of October.  He will be due for recheck on his blood pressure and other problems in.  Thanks.

## 2021-10-13 RX ORDER — NIFEDIPINE 60 MG/1
60 TABLET, EXTENDED RELEASE ORAL
COMMUNITY
Start: 2021-07-13 | End: 2021-10-21 | Stop reason: SDUPTHER

## 2021-10-13 RX ORDER — NIFEDIPINE 60 MG/1
TABLET, EXTENDED RELEASE ORAL
Qty: 90 TABLET | Refills: 0 | Status: SHIPPED | OUTPATIENT
Start: 2021-10-13 | End: 2021-10-21 | Stop reason: SDUPTHER

## 2021-10-13 RX ORDER — TRIAMTERENE AND HYDROCHLOROTHIAZIDE 37.5; 25 MG/1; MG/1
1 TABLET ORAL DAILY
COMMUNITY
Start: 2021-08-03 | End: 2021-10-21 | Stop reason: SDUPTHER

## 2021-10-13 RX ORDER — LOSARTAN POTASSIUM 50 MG/1
50 TABLET ORAL DAILY
COMMUNITY
Start: 2021-08-01 | End: 2021-10-21 | Stop reason: SDUPTHER

## 2021-10-13 RX ORDER — ATORVASTATIN CALCIUM 20 MG/1
20 TABLET, FILM COATED ORAL
COMMUNITY
Start: 2021-08-12 | End: 2021-10-21 | Stop reason: SDUPTHER

## 2021-10-21 ENCOUNTER — OFFICE VISIT (OUTPATIENT)
Dept: FAMILY MEDICINE CLINIC | Age: 71
End: 2021-10-21

## 2021-10-21 ENCOUNTER — LAB (OUTPATIENT)
Dept: LAB | Facility: HOSPITAL | Age: 71
End: 2021-10-21

## 2021-10-21 VITALS
SYSTOLIC BLOOD PRESSURE: 137 MMHG | HEIGHT: 69 IN | TEMPERATURE: 97.7 F | HEART RATE: 62 BPM | DIASTOLIC BLOOD PRESSURE: 72 MMHG | WEIGHT: 225.8 LBS | BODY MASS INDEX: 33.44 KG/M2

## 2021-10-21 DIAGNOSIS — Z11.59 NEED FOR HEPATITIS C SCREENING TEST: ICD-10-CM

## 2021-10-21 DIAGNOSIS — Z79.899 OTHER LONG TERM (CURRENT) DRUG THERAPY: ICD-10-CM

## 2021-10-21 DIAGNOSIS — I10 ESSENTIAL HYPERTENSION: ICD-10-CM

## 2021-10-21 DIAGNOSIS — E78.2 MIXED HYPERLIPIDEMIA: ICD-10-CM

## 2021-10-21 DIAGNOSIS — Z00.00 PHYSICAL EXAM: Primary | ICD-10-CM

## 2021-10-21 LAB
ALBUMIN SERPL-MCNC: 4.7 G/DL (ref 3.5–5.2)
ALBUMIN/GLOB SERPL: 1.9 G/DL
ALP SERPL-CCNC: 112 U/L (ref 39–117)
ALT SERPL W P-5'-P-CCNC: 20 U/L (ref 1–41)
ANION GAP SERPL CALCULATED.3IONS-SCNC: 8.2 MMOL/L (ref 5–15)
AST SERPL-CCNC: 24 U/L (ref 1–40)
BILIRUB SERPL-MCNC: 0.6 MG/DL (ref 0–1.2)
BUN SERPL-MCNC: 17 MG/DL (ref 8–23)
BUN/CREAT SERPL: 14.8 (ref 7–25)
CALCIUM SPEC-SCNC: 9.9 MG/DL (ref 8.6–10.5)
CHLORIDE SERPL-SCNC: 100 MMOL/L (ref 98–107)
CHOLEST SERPL-MCNC: 144 MG/DL (ref 0–200)
CO2 SERPL-SCNC: 30.8 MMOL/L (ref 22–29)
CREAT SERPL-MCNC: 1.15 MG/DL (ref 0.76–1.27)
DEPRECATED RDW RBC AUTO: 43.3 FL (ref 37–54)
ERYTHROCYTE [DISTWIDTH] IN BLOOD BY AUTOMATED COUNT: 12.5 % (ref 12.3–15.4)
GFR SERPL CREATININE-BSD FRML MDRD: 63 ML/MIN/1.73
GLOBULIN UR ELPH-MCNC: 2.5 GM/DL
GLUCOSE SERPL-MCNC: 94 MG/DL (ref 65–99)
HCT VFR BLD AUTO: 44.7 % (ref 37.5–51)
HDLC SERPL-MCNC: 52 MG/DL (ref 40–60)
HGB BLD-MCNC: 15.5 G/DL (ref 13–17.7)
LDLC SERPL CALC-MCNC: 73 MG/DL (ref 0–100)
LDLC/HDLC SERPL: 1.37 {RATIO}
MCH RBC QN AUTO: 32.4 PG (ref 26.6–33)
MCHC RBC AUTO-ENTMCNC: 34.7 G/DL (ref 31.5–35.7)
MCV RBC AUTO: 93.3 FL (ref 79–97)
PLATELET # BLD AUTO: 206 10*3/MM3 (ref 140–450)
PMV BLD AUTO: 9.6 FL (ref 6–12)
POTASSIUM SERPL-SCNC: 4.6 MMOL/L (ref 3.5–5.2)
PROT SERPL-MCNC: 7.2 G/DL (ref 6–8.5)
RBC # BLD AUTO: 4.79 10*6/MM3 (ref 4.14–5.8)
SODIUM SERPL-SCNC: 139 MMOL/L (ref 136–145)
TRIGL SERPL-MCNC: 105 MG/DL (ref 0–150)
TSH SERPL DL<=0.05 MIU/L-ACNC: 2.74 UIU/ML (ref 0.27–4.2)
VLDLC SERPL-MCNC: 19 MG/DL (ref 5–40)
WBC # BLD AUTO: 5.61 10*3/MM3 (ref 3.4–10.8)

## 2021-10-21 PROCEDURE — 1125F AMNT PAIN NOTED PAIN PRSNT: CPT | Performed by: FAMILY MEDICINE

## 2021-10-21 PROCEDURE — 80061 LIPID PANEL: CPT

## 2021-10-21 PROCEDURE — 80053 COMPREHEN METABOLIC PANEL: CPT

## 2021-10-21 PROCEDURE — G0439 PPPS, SUBSEQ VISIT: HCPCS | Performed by: FAMILY MEDICINE

## 2021-10-21 PROCEDURE — 84443 ASSAY THYROID STIM HORMONE: CPT

## 2021-10-21 PROCEDURE — 36415 COLL VENOUS BLD VENIPUNCTURE: CPT

## 2021-10-21 PROCEDURE — 1170F FXNL STATUS ASSESSED: CPT | Performed by: FAMILY MEDICINE

## 2021-10-21 PROCEDURE — 85027 COMPLETE CBC AUTOMATED: CPT

## 2021-10-21 PROCEDURE — 1160F RVW MEDS BY RX/DR IN RCRD: CPT | Performed by: FAMILY MEDICINE

## 2021-10-21 PROCEDURE — 86803 HEPATITIS C AB TEST: CPT

## 2021-10-21 RX ORDER — TRIAMTERENE AND HYDROCHLOROTHIAZIDE 37.5; 25 MG/1; MG/1
1 TABLET ORAL DAILY
Qty: 90 TABLET | Refills: 1 | Status: SHIPPED | OUTPATIENT
Start: 2021-10-21 | End: 2022-05-03

## 2021-10-21 RX ORDER — ATORVASTATIN CALCIUM 20 MG/1
20 TABLET, FILM COATED ORAL NIGHTLY
Qty: 90 TABLET | Refills: 1 | Status: SHIPPED | OUTPATIENT
Start: 2021-10-21 | End: 2022-05-13

## 2021-10-21 RX ORDER — NIFEDIPINE 60 MG/1
60 TABLET, EXTENDED RELEASE ORAL
Qty: 90 TABLET | Refills: 1 | Status: SHIPPED | OUTPATIENT
Start: 2021-10-21 | End: 2022-01-12

## 2021-10-21 RX ORDER — LOSARTAN POTASSIUM 50 MG/1
50 TABLET ORAL DAILY
Qty: 90 TABLET | Refills: 1 | Status: SHIPPED | OUTPATIENT
Start: 2021-10-21 | End: 2021-10-29

## 2021-10-21 NOTE — PATIENT INSTRUCTIONS
Advance Care Planning and Advance Directives     You make decisions on a daily basis - decisions about where you want to live, your career, your home, your life. Perhaps one of the most important decisions you face is your choice for future medical care. Take time to talk with your family and your healthcare team and start planning today.  Advance Care Planning is a process that can help you:  · Understand possible future healthcare decisions in light of your own experiences  · Reflect on those decision in light of your goals and values  · Discuss your decisions with those closest to you and the healthcare professionals that care for you  · Make a plan by creating a document that reflects your wishes    Surrogate Decision Maker  In the event of a medical emergency, which has left you unable to communicate or to make your own decisions, you would need someone to make decisions for you.  It is important to discuss your preferences for medical treatment with this person while you are in good health.     Qualities of a surrogate decision maker:  • Willing to take on this role and responsibility  • Knows what you want for future medical care  • Willing to follow your wishes even if they don't agree with them  • Able to make difficult medical decisions under stressful circumstances    Advance Directives  These are legal documents you can create that will guide your healthcare team and decision maker(s) when needed. These documents can be stored in the electronic medical record.    · Living Will - a legal document to guide your care if you have a terminal condition or a serious illness and are unable to communicate. States vary by statute in document names/types, but most forms may include one or more of the following:        -  Directions regarding life-prolonging treatments        -  Directions regarding artificially provided nutrition/hydration        -  Choosing a healthcare decision maker        -  Direction  regarding organ/tissue donation    · Durable Power of  for Healthcare - this document names an -in-fact to make medical decisions for you, but it may also allow this person to make personal and financial decisions for you. Please seek the advice of an  if you need this type of document.    **Advance Directives are not required and no one may discriminate against you if you do not sign one.    Medical Orders  Many states allow specific forms/orders signed by your physician to record your wishes for medical treatment in your current state of health. This form, signed in personal communication with your physician, addresses resuscitation and other medical interventions that you may or may not want.      For more information or to schedule a time with a [facility] Advance Care Planning Facilitator contact: [facility] or call [phone] and someone will contact you directly.  Preventive Care 65 Years and Older, Male  Preventive care refers to lifestyle choices and visits with your health care provider that can promote health and wellness. This includes:  · A yearly physical exam. This is also called an annual well check.  · Regular dental and eye exams.  · Immunizations.  · Screening for certain conditions.  · Healthy lifestyle choices, such as diet and exercise.  What can I expect for my preventive care visit?  Physical exam  Your health care provider will check:  · Height and weight. These may be used to calculate body mass index (BMI), which is a measurement that tells if you are at a healthy weight.  · Heart rate and blood pressure.  · Your skin for abnormal spots.  Counseling  Your health care provider may ask you questions about:  · Alcohol, tobacco, and drug use.  · Emotional well-being.  · Home and relationship well-being.  · Sexual activity.  · Eating habits.  · History of falls.  · Memory and ability to understand (cognition).  · Work and work environment.  What  immunizations do I need?    Influenza (flu) vaccine  · This is recommended every year.  Tetanus, diphtheria, and pertussis (Tdap) vaccine  · You may need a Td booster every 10 years.  Varicella (chickenpox) vaccine  · You may need this vaccine if you have not already been vaccinated.  Zoster (shingles) vaccine  · You may need this after age 60.  Pneumococcal conjugate (PCV13) vaccine  · One dose is recommended after age 65.  Pneumococcal polysaccharide (PPSV23) vaccine  · One dose is recommended after age 65.  Measles, mumps, and rubella (MMR) vaccine  · You may need at least one dose of MMR if you were born in 1957 or later. You may also need a second dose.  Meningococcal conjugate (MenACWY) vaccine  · You may need this if you have certain conditions.  Hepatitis A vaccine  · You may need this if you have certain conditions or if you travel or work in places where you may be exposed to hepatitis A.  Hepatitis B vaccine  · You may need this if you have certain conditions or if you travel or work in places where you may be exposed to hepatitis B.  Haemophilus influenzae type b (Hib) vaccine  · You may need this if you have certain conditions.  You may receive vaccines as individual doses or as more than one vaccine together in one shot (combination vaccines). Talk with your health care provider about the risks and benefits of combination vaccines.  What tests do I need?  Blood tests  · Lipid and cholesterol levels. These may be checked every 5 years, or more frequently depending on your overall health.  · Hepatitis C test.  · Hepatitis B test.  Screening  · Lung cancer screening. You may have this screening every year starting at age 55 if you have a 30-pack-year history of smoking and currently smoke or have quit within the past 15 years.  · Colorectal cancer screening. All adults should have this screening starting at age 50 and continuing until age 75. Your health care provider may recommend screening at age 45 if  you are at increased risk. You will have tests every 1-10 years, depending on your results and the type of screening test.  · Prostate cancer screening. Recommendations will vary depending on your family history and other risks.  · Diabetes screening. This is done by checking your blood sugar (glucose) after you have not eaten for a while (fasting). You may have this done every 1-3 years.  · Abdominal aortic aneurysm (AAA) screening. You may need this if you are a current or former smoker.  · Sexually transmitted disease (STD) testing.  Follow these instructions at home:  Eating and drinking  · Eat a diet that includes fresh fruits and vegetables, whole grains, lean protein, and low-fat dairy products. Limit your intake of foods with high amounts of sugar, saturated fats, and salt.  · Take vitamin and mineral supplements as recommended by your health care provider.  · Do not drink alcohol if your health care provider tells you not to drink.  · If you drink alcohol:  ? Limit how much you have to 0-2 drinks a day.  ? Be aware of how much alcohol is in your drink. In the [place] one drink equals one 12 oz bottle of beer (355 mL), one 5 oz glass of wine (148 mL), or one 1½ oz glass of hard liquor (44 mL).  Lifestyle  · Take daily care of your teeth and gums.  · Stay active. Exercise for at least 30 minutes on 5 or more days each week.  · Do not use any products that contain nicotine or tobacco, such as cigarettes, e-cigarettes, and chewing tobacco. If you need help quitting, ask your health care provider.  · If you are sexually active, practice safe sex. Use a condom or other form of protection to prevent STIs (sexually transmitted infections).  · Talk with your health care provider about taking a low-dose aspirin or statin.  What's next?  · Visit your health care provider once a year for a well check visit.  · Ask your health care provider how often you should have your eyes and teeth checked.  · Stay up to date on all  vaccines.  This information is not intended to replace advice given to you by your health care provider. Make sure you discuss any questions you have with your health care provider.  Document Revised: 12/12/2019 Document Reviewed: 12/12/2019  Elsevier Patient Education © 2020 [employer]

## 2021-10-21 NOTE — PROGRESS NOTES
The ABCs of the Annual Wellness Visit  Subsequent Medicare Wellness Visit    Chief Complaint:  Medicare wellness exam, blood pressure, cholesterol    Subjective    History of Present Illness:  Eliud Ramirez is a 71 y.o. male who presents for a Subsequent Medicare Wellness Visit.    The following portions of the patient's history were reviewed and updated as appropriate: allergies, current medications, past family history, past medical history, past social history, past surgical history and problem list. {Optional Link Review (Popup) :23}    Compared to one year ago, the patient feels his physical health is the same.    Compared to one year ago, the patient feels his mental health is the same.    Recent Hospitalizations:  He was not admitted to the hospital during the last year.       Current Medical Providers:  Patient Care Team:  Franky Brenner MD as PCP - General (Family Medicine)    Outpatient Medications Prior to Visit   Medication Sig Dispense Refill   • atorvastatin (LIPITOR) 20 MG tablet Take 20 mg by mouth every night at bedtime.     • losartan (COZAAR) 50 MG tablet Take 50 mg by mouth Daily.     • NIFEdipine XL (PROCARDIA XL) 60 MG 24 hr tablet TAKE 1 TABLET BY MOUTH ONCE DAILY AT BEDTIME 90 tablet 0   • NIFEdipine XL (PROCARDIA XL) 60 MG 24 hr tablet Take 60 mg by mouth every night at bedtime.     • triamterene-hydrochlorothiazide (MAXZIDE-25) 37.5-25 MG per tablet Take 1 tablet by mouth Daily.       No facility-administered medications prior to visit.       No opioid medication identified on active medication list. I have reviewed chart for other potential  high risk medication/s and harmful drug interactions in the elderly.          Aspirin is not on active medication list.  Aspirin use is indicated based on review of current medical condition/s. Pros and cons of this therapy have been discussed with this patient. Benefits of this medication outweigh potential harm.  Patient has been  "instructed to start taking this medication..    Patient Active Problem List   Diagnosis   • Essential hypertension   • Mixed hyperlipidemia   • Other long term (current) drug therapy     Advance Care Planning   Advance Directive is not on file.  ACP discussion was held with the patient during this visit. Patient does not have an advance directive, information provided.  He says that his wife, Arti, would make decisions for him if needed.    In addition to the above, Tolu is also in today for follow-up on his usual care.  He does have a history of hypertension and remains on treatment for this.  His medications are noted above.  His blood pressure is mildly elevated today, but it is in a reasonable range.  He is seeing the blood pressure run between 125 and 140 most of the time at home.  Tolu also takes cholesterol-lowering medication and is due for follow-up.      Review of Systems:  Review of Systems   Constitutional: Negative for chills and fever.   Respiratory: Negative for cough and shortness of breath.    Cardiovascular: Negative for chest pain and palpitations.   Gastrointestinal: Negative for abdominal pain, nausea and vomiting.         Objective       Vitals:    10/21/21 1042   BP: 149/63   BP Location: Right arm   Patient Position: Sitting   Pulse: 68   Temp: 97.7 °F (36.5 °C)   TempSrc: Oral   Weight: 102 kg (225 lb 12.8 oz)   Height: 176.5 cm (69.49\")   PainSc: 0-No pain     BMI Readings from Last 1 Encounters:   10/21/21 32.88 kg/m²   BMI is above normal parameters. Recommendations include: exercise counseling and nutrition counseling    Does the patient have evidence of cognitive impairment? No    Physical Exam  Vitals and nursing note reviewed.   Constitutional:       General: He is not in acute distress.     Appearance: He is obese. He is not ill-appearing.   HENT:      Right Ear: Tympanic membrane and ear canal normal.      Left Ear: Tympanic membrane and ear canal normal.      Mouth/Throat:      Mouth: " Mucous membranes are moist.      Comments: Pharynx appears normal  Eyes:      Extraocular Movements: Extraocular movements intact.      Pupils: Pupils are equal, round, and reactive to light.   Neck:      Thyroid: No thyromegaly.   Cardiovascular:      Rate and Rhythm: Normal rate and regular rhythm.      Heart sounds: No murmur heard.      Pulmonary:      Effort: Pulmonary effort is normal.      Breath sounds: Normal breath sounds.   Abdominal:      General: There is no distension.      Palpations: Abdomen is soft. There is no mass.      Tenderness: There is no abdominal tenderness.   Musculoskeletal:      Cervical back: Normal range of motion.      Right lower leg: Edema (mild) present.      Left lower leg: Edema present.   Skin:     Findings: No lesion or rash.   Neurological:      General: No focal deficit present.      Mental Status: He is oriented to person, place, and time.      Cranial Nerves: No cranial nerve deficit.   Psychiatric:         Mood and Affect: Mood normal.       The following data was reviewed by Franky Brenner MD on 10/21/2021.  Lab Results   Component Value Date    WBC 7.26 04/13/2020    HGB 13.5 (L) 04/13/2020    HCT 38.2 (L) 04/13/2020    MCV 91.2 04/13/2020     04/13/2020     Lab Results   Component Value Date    BUN 16 04/22/2021    CREATININE 1.03 04/22/2021    BCR 16 04/22/2021    K 4.2 04/22/2021    CO2 25 04/22/2021    CALCIUM 9.6 04/22/2021    ALBUMIN 4.4 04/22/2021    LABIL2 1.5 04/22/2021    AST 23 04/22/2021    ALT 19 04/22/2021     Lab Results   Component Value Date    CHLPL 167 08/06/2020    TRIG 105 08/06/2020    HDL 49 08/06/2020    LDL 97 08/06/2020     Lab Results   Component Value Date    TSH 3.070 04/22/2021     No results found for: HGBA1C  Lab Results   Component Value Date    PSA 3.16 04/22/2021    PSA 2.69 10/03/2019          HEALTH RISK ASSESSMENT    Smoking Status:  Social History     Tobacco Use   Smoking Status Former Smoker   • Types: Cigarettes    • Quit date:    • Years since quittin.8   Smokeless Tobacco Never Used     Alcohol Consumption:  Social History     Substance and Sexual Activity   Alcohol Use Not Currently     Fall Risk Screen:    STEADI Fall Risk Assessment was completed, and patient is at LOW risk for falls.Assessment completed on:10/21/2021    Depression Screening:  PHQ-2/PHQ-9 Depression Screening 10/21/2021   Little interest or pleasure in doing things 0   Feeling down, depressed, or hopeless 0   Total Score 0       Health Habits and Functional and Cognitive Screening:  Functional & Cognitive Status 10/21/2021   Do you have difficulty preparing food and eating? No   Do you have difficulty bathing yourself, getting dressed or grooming yourself? No   Do you have difficulty using the toilet? No   Do you have difficulty moving around from place to place? No   Do you have trouble with steps or getting out of a bed or a chair? No   Current Diet Well Balanced Diet   Dental Exam Not up to date   Eye Exam Up to date   Exercise (times per week) 0 times per week   Current Exercises Include No Regular Exercise   Do you need help using the phone?  No   Are you deaf or do you have serious difficulty hearing?  No   Do you need help with transportation? No   Do you need help shopping? No   Do you need help preparing meals?  No   Do you need help with housework?  No   Do you need help with laundry? No   Do you need help taking your medications? No   Do you need help managing money? No   Do you ever drive or ride in a car without wearing a seat belt? No   Have you felt unusual stress, anger or loneliness in the last month? No   Who do you live with? Spouse   If you need help, do you have trouble finding someone available to you? No   Have you been bothered in the last four weeks by sexual problems? No   Do you have difficulty concentrating, remembering or making decisions? No       Age-appropriate Screening Schedule:  Refer to the list below for  future screening recommendations based on patient's age, sex and/or medical conditions. Orders for these recommended tests are listed in the plan section. The patient has been provided with a written plan.    Health Maintenance   Topic Date Due   • TDAP/TD VACCINES (1 - Tdap) Never done   • ZOSTER VACCINE (1 of 2) Never done   • LIPID PANEL  10/21/2021   • INFLUENZA VACCINE  10/21/2022 (Originally 8/1/2021)                       Assessment and Plan:       CMS Preventative Services Quick Reference  Risk Factors Identified During Encounter  Cardiovascular Disease  Immunizations Discussed/Encouraged (specific Immunizations; Tdap, Influenza, Pneumococcal 23, Shingrix and COVID19  Obesity/Overweight     The above risks/problems have been discussed with the patient.  Follow up actions/plans if indicated are seen below in the Assessment/Plan Section.  Pertinent information has been shared with the patient in the After Visit Summary.    Today, we have again reviewed Tolu's care.  He is somewhat resistant to certain vaccines, but I have encouraged him to consider the COVID-19 vaccine.  We will move forward with repeat testing as noted below for his usual problems.  His blood pressure is mildly elevated and will be repeated here.  Otherwise, I will refill his medications and plan to see him back in about 6 months    Diagnoses and all orders for this visit:    1. Physical exam (Primary)    2. Essential hypertension  -     Comprehensive Metabolic Panel; Future  -     losartan (COZAAR) 50 MG tablet; Take 1 tablet by mouth Daily.  Dispense: 90 tablet; Refill: 1  -     NIFEdipine XL (PROCARDIA XL) 60 MG 24 hr tablet; Take 1 tablet by mouth every night at bedtime.  Dispense: 90 tablet; Refill: 1  -     triamterene-hydrochlorothiazide (MAXZIDE-25) 37.5-25 MG per tablet; Take 1 tablet by mouth Daily.  Dispense: 90 tablet; Refill: 1    3. Mixed hyperlipidemia  -     Comprehensive Metabolic Panel; Future  -     Lipid Panel; Future  -      TSH; Future  -     atorvastatin (LIPITOR) 20 MG tablet; Take 1 tablet by mouth Every Night.  Dispense: 90 tablet; Refill: 1    4. Other long term (current) drug therapy  -     CBC (No Diff); Future    5. Need for hepatitis C screening test  -     Hepatitis C Antibody; Future        Follow Up:   Return in about 6 months (around 4/21/2022).     An After Visit Summary and PPPS were given to the patient.

## 2021-10-22 LAB — HCV AB SER DONR QL: NORMAL

## 2021-10-29 DIAGNOSIS — I10 ESSENTIAL HYPERTENSION: ICD-10-CM

## 2021-10-29 RX ORDER — LOSARTAN POTASSIUM 50 MG/1
50 TABLET ORAL DAILY
Qty: 90 TABLET | Refills: 1 | Status: SHIPPED | OUTPATIENT
Start: 2021-10-29 | End: 2022-04-28

## 2022-01-11 DIAGNOSIS — I10 ESSENTIAL HYPERTENSION: ICD-10-CM

## 2022-01-12 RX ORDER — NIFEDIPINE 60 MG/1
TABLET, EXTENDED RELEASE ORAL
Qty: 90 TABLET | Refills: 0 | Status: SHIPPED | OUTPATIENT
Start: 2022-01-12 | End: 2022-04-13

## 2022-04-13 DIAGNOSIS — I10 ESSENTIAL HYPERTENSION: ICD-10-CM

## 2022-04-13 RX ORDER — NIFEDIPINE 60 MG/1
TABLET, EXTENDED RELEASE ORAL
Qty: 90 TABLET | Refills: 0 | Status: SHIPPED | OUTPATIENT
Start: 2022-04-13 | End: 2022-07-07 | Stop reason: SDUPTHER

## 2022-04-27 DIAGNOSIS — I10 ESSENTIAL HYPERTENSION: ICD-10-CM

## 2022-04-28 RX ORDER — LOSARTAN POTASSIUM 50 MG/1
50 TABLET ORAL DAILY
Qty: 90 TABLET | Refills: 0 | Status: SHIPPED | OUTPATIENT
Start: 2022-04-28 | End: 2022-07-07 | Stop reason: SDUPTHER

## 2022-05-02 DIAGNOSIS — I10 ESSENTIAL HYPERTENSION: ICD-10-CM

## 2022-05-03 RX ORDER — TRIAMTERENE AND HYDROCHLOROTHIAZIDE 37.5; 25 MG/1; MG/1
1 TABLET ORAL DAILY
Qty: 90 TABLET | Refills: 0 | Status: SHIPPED | OUTPATIENT
Start: 2022-05-03 | End: 2022-07-07 | Stop reason: SDUPTHER

## 2022-05-03 NOTE — TELEPHONE ENCOUNTER
I did send a refill on this.  I had recommended follow-up visit in 6 months.  He is past due.  Please schedule.  Thanks.

## 2022-05-12 DIAGNOSIS — E78.2 MIXED HYPERLIPIDEMIA: ICD-10-CM

## 2022-05-13 RX ORDER — ATORVASTATIN CALCIUM 20 MG/1
TABLET, FILM COATED ORAL
Qty: 90 TABLET | Refills: 0 | Status: SHIPPED | OUTPATIENT
Start: 2022-05-13 | End: 2022-07-07 | Stop reason: SDUPTHER

## 2022-05-18 NOTE — TELEPHONE ENCOUNTER
Caller: Eliud Ramirez    Relationship to patient: Self    Best call back number: [phone]    Patient is needing: PATIENT CALLED RETURNING A CALL BACK FOR MY. PATIENT WOULD LIKE A CALL BACK PLEASE ADVISE THANK YOU.         HUB STAFF UNABLE TO WARM TRANSFER    decreased balance/decreased flexibility/decreased strength

## 2022-07-07 ENCOUNTER — LAB (OUTPATIENT)
Dept: LAB | Facility: HOSPITAL | Age: 72
End: 2022-07-07

## 2022-07-07 ENCOUNTER — OFFICE VISIT (OUTPATIENT)
Dept: FAMILY MEDICINE CLINIC | Age: 72
End: 2022-07-07

## 2022-07-07 VITALS
DIASTOLIC BLOOD PRESSURE: 68 MMHG | HEART RATE: 58 BPM | SYSTOLIC BLOOD PRESSURE: 137 MMHG | BODY MASS INDEX: 32.73 KG/M2 | WEIGHT: 221 LBS | TEMPERATURE: 98.3 F | HEIGHT: 69 IN

## 2022-07-07 DIAGNOSIS — E78.2 MIXED HYPERLIPIDEMIA: ICD-10-CM

## 2022-07-07 DIAGNOSIS — Z12.5 SCREENING FOR PROSTATE CANCER: ICD-10-CM

## 2022-07-07 DIAGNOSIS — I10 ESSENTIAL HYPERTENSION: Primary | ICD-10-CM

## 2022-07-07 DIAGNOSIS — Z79.899 OTHER LONG TERM (CURRENT) DRUG THERAPY: ICD-10-CM

## 2022-07-07 DIAGNOSIS — I10 ESSENTIAL HYPERTENSION: ICD-10-CM

## 2022-07-07 LAB
ALBUMIN SERPL-MCNC: 4.4 G/DL (ref 3.5–5.2)
ALBUMIN/GLOB SERPL: 1.7 G/DL
ALP SERPL-CCNC: 98 U/L (ref 39–117)
ALT SERPL W P-5'-P-CCNC: 18 U/L (ref 1–41)
ANION GAP SERPL CALCULATED.3IONS-SCNC: 10.4 MMOL/L (ref 5–15)
AST SERPL-CCNC: 19 U/L (ref 1–40)
BILIRUB SERPL-MCNC: 0.7 MG/DL (ref 0–1.2)
BUN SERPL-MCNC: 19 MG/DL (ref 8–23)
BUN/CREAT SERPL: 18.3 (ref 7–25)
CALCIUM SPEC-SCNC: 9.3 MG/DL (ref 8.6–10.5)
CHLORIDE SERPL-SCNC: 102 MMOL/L (ref 98–107)
CHOLEST SERPL-MCNC: 148 MG/DL (ref 0–200)
CO2 SERPL-SCNC: 28.6 MMOL/L (ref 22–29)
CREAT SERPL-MCNC: 1.04 MG/DL (ref 0.76–1.27)
DEPRECATED RDW RBC AUTO: 43.9 FL (ref 37–54)
EGFRCR SERPLBLD CKD-EPI 2021: 75.8 ML/MIN/1.73
ERYTHROCYTE [DISTWIDTH] IN BLOOD BY AUTOMATED COUNT: 12.4 % (ref 12.3–15.4)
GLOBULIN UR ELPH-MCNC: 2.6 GM/DL
GLUCOSE SERPL-MCNC: 97 MG/DL (ref 65–99)
HCT VFR BLD AUTO: 42.9 % (ref 37.5–51)
HDLC SERPL-MCNC: 52 MG/DL (ref 40–60)
HGB BLD-MCNC: 14.5 G/DL (ref 13–17.7)
LDLC SERPL CALC-MCNC: 76 MG/DL (ref 0–100)
LDLC/HDLC SERPL: 1.43 {RATIO}
MCH RBC QN AUTO: 32.2 PG (ref 26.6–33)
MCHC RBC AUTO-ENTMCNC: 33.8 G/DL (ref 31.5–35.7)
MCV RBC AUTO: 95.1 FL (ref 79–97)
PLATELET # BLD AUTO: 184 10*3/MM3 (ref 140–450)
PMV BLD AUTO: 10.1 FL (ref 6–12)
POTASSIUM SERPL-SCNC: 4.2 MMOL/L (ref 3.5–5.2)
PROT SERPL-MCNC: 7 G/DL (ref 6–8.5)
PSA SERPL-MCNC: 3.16 NG/ML (ref 0–4)
RBC # BLD AUTO: 4.51 10*6/MM3 (ref 4.14–5.8)
SODIUM SERPL-SCNC: 141 MMOL/L (ref 136–145)
TRIGL SERPL-MCNC: 109 MG/DL (ref 0–150)
TSH SERPL DL<=0.05 MIU/L-ACNC: 2.86 UIU/ML (ref 0.27–4.2)
VLDLC SERPL-MCNC: 20 MG/DL (ref 5–40)
WBC NRBC COR # BLD: 6.64 10*3/MM3 (ref 3.4–10.8)

## 2022-07-07 PROCEDURE — 84443 ASSAY THYROID STIM HORMONE: CPT

## 2022-07-07 PROCEDURE — 85027 COMPLETE CBC AUTOMATED: CPT

## 2022-07-07 PROCEDURE — 99214 OFFICE O/P EST MOD 30 MIN: CPT | Performed by: FAMILY MEDICINE

## 2022-07-07 PROCEDURE — 80053 COMPREHEN METABOLIC PANEL: CPT

## 2022-07-07 PROCEDURE — 36415 COLL VENOUS BLD VENIPUNCTURE: CPT

## 2022-07-07 PROCEDURE — 80061 LIPID PANEL: CPT

## 2022-07-07 PROCEDURE — G0103 PSA SCREENING: HCPCS

## 2022-07-07 RX ORDER — LOSARTAN POTASSIUM 50 MG/1
50 TABLET ORAL DAILY
Qty: 90 TABLET | Refills: 3 | Status: SHIPPED | OUTPATIENT
Start: 2022-07-07

## 2022-07-07 RX ORDER — TRIAMTERENE AND HYDROCHLOROTHIAZIDE 37.5; 25 MG/1; MG/1
1 TABLET ORAL DAILY
Qty: 90 TABLET | Refills: 3 | Status: SHIPPED | OUTPATIENT
Start: 2022-07-07

## 2022-07-07 RX ORDER — NIFEDIPINE 60 MG/1
60 TABLET, EXTENDED RELEASE ORAL
Qty: 90 TABLET | Refills: 3 | Status: SHIPPED | OUTPATIENT
Start: 2022-07-07

## 2022-07-07 RX ORDER — ATORVASTATIN CALCIUM 20 MG/1
20 TABLET, FILM COATED ORAL NIGHTLY
Qty: 90 TABLET | Refills: 3 | Status: SHIPPED | OUTPATIENT
Start: 2022-07-07

## 2022-07-07 NOTE — PROGRESS NOTES
Eliud Ramirez presents to [facility].    Chief Complaint:  Blood pressure, cholesterol    Subjective   {Problem List  Visit Diagnosis   Encounters  Notes  Medications  Labs  Result Review Imaging  Media :23}     History of Present Illness:  Tolu is in today for follow-up on his care.  He has hypertension and hyperlipidemia for which he remains on treatments as noted below.  He is not aware of any side effects from the medications.  He is due for repeat blood work presently.  He is also due for prostate check, but he declines digital rectal exam today.      Review of Systems:  Review of Systems   Constitutional: Negative for chills and fever.   Respiratory: Negative for cough and shortness of breath.    Cardiovascular: Negative for chest pain and palpitations.   Gastrointestinal: Negative for abdominal pain, nausea and vomiting.        Objective   Medical History:  Past Medical History:   • Essential hypertension   • Mixed hyperlipidemia     No past surgical history on file.   Family History   Problem Relation Age of Onset   • Hyperlipidemia Mother    • Hypothyroidism Mother    • Hypertension Mother      Social History     Tobacco Use   • Smoking status: Former Smoker     Types: Cigarettes     Quit date:      Years since quittin.5   • Smokeless tobacco: Never Used   Substance Use Topics   • Alcohol use: Not Currently       Health Maintenance Due   Topic Date Due   • COLORECTAL CANCER SCREENING  Never done   • TDAP/TD VACCINES (1 - Tdap) Never done   • ZOSTER VACCINE (1 of 2) Never done   • AAA SCREEN (ONE-TIME)  Never done        There is no immunization history for the selected administration types on file for this patient.    No Known Allergies     Medications:  Current Outpatient Medications on File Prior to Visit   Medication Sig   • [DISCONTINUED] atorvastatin (LIPITOR) 20 MG tablet TAKE 1 TABLET BY MOUTH ONCE DAILY AT NIGHT   • [DISCONTINUED] losartan (COZAAR)  "50 MG tablet Take 1 tablet by mouth Daily.   • [DISCONTINUED] NIFEdipine XL (PROCARDIA XL) 60 MG 24 hr tablet TAKE 1 TABLET BY MOUTH ONCE DAILY AT BEDTIME   • [DISCONTINUED] triamterene-hydrochlorothiazide (MAXZIDE-25) 37.5-25 MG per tablet Take 1 tablet by mouth Daily.     No current facility-administered medications on file prior to visit.       Vital Signs:   /68 (BP Location: Right arm, Patient Position: Sitting, Cuff Size: Adult)   Pulse 58   Temp 98.3 °F (36.8 °C) (Oral)   Ht 176.5 cm (69.49\")   Wt 100 kg (221 lb)   BMI 32.18 kg/m²       Physical Exam:  Physical Exam  Vitals reviewed.   Constitutional:       General: He is not in acute distress.     Appearance: He is not ill-appearing.   Eyes:      Pupils: Pupils are equal, round, and reactive to light.   Neck:      Comments: No thyromegaly  Cardiovascular:      Rate and Rhythm: Normal rate.      Comments: Presumed bigeminy  Pulmonary:      Effort: Pulmonary effort is normal.      Breath sounds: Normal breath sounds.   Abdominal:      General: There is no distension.      Palpations: Abdomen is soft.      Tenderness: There is no abdominal tenderness.   Musculoskeletal:      Cervical back: Neck supple.   Lymphadenopathy:      Cervical: No cervical adenopathy.   Skin:     Findings: No lesion or rash.   Neurological:      Mental Status: He is alert.         Result Review      The following data was reviewed by Franky Brenner MD on 07/07/2022.  Lab Results   Component Value Date    WBC 5.61 10/21/2021    HGB 15.5 10/21/2021    HCT 44.7 10/21/2021    MCV 93.3 10/21/2021     10/21/2021     Lab Results   Component Value Date    GLUCOSE 94 10/21/2021    BUN 17 10/21/2021    CREATININE 1.15 10/21/2021     10/21/2021    K 4.6 10/21/2021     10/21/2021    CO2 30.8 (H) 10/21/2021    CALCIUM 9.9 10/21/2021    PROTEINTOT 7.2 10/21/2021    ALBUMIN 4.70 10/21/2021    ALT 20 10/21/2021    AST 24 10/21/2021    ALKPHOS 112 10/21/2021    " BILITOT 0.6 10/21/2021    EGFRIFNONA 63 10/21/2021    GLOB 2.5 10/21/2021    AGRATIO 1.9 10/21/2021    BCR 14.8 10/21/2021    ANIONGAP 8.2 10/21/2021      Lab Results   Component Value Date    CHOL 144 10/21/2021    CHLPL 167 08/06/2020    TRIG 105 10/21/2021    HDL 52 10/21/2021    LDL 73 10/21/2021     Lab Results   Component Value Date    TSH 2.740 10/21/2021     No results found for: HGBA1C  Lab Results   Component Value Date    PSA 3.16 04/22/2021    PSA 2.69 10/03/2019            Assessment and Plan:   Today, we have reviewed his care.  Tolu remains in good overall health.  His blood pressure is fairly well controlled today.  We will refill his needed medications and update blood work as noted.  We will plan to see him back in about a year.  We discussed that he has had some weight loss, but he believes that is due to him watching his diet a little more carefully.  We also again discussed the positive Cologuard result.  I recommended he move forward with colonoscopy, but he has declined.  He is aware that we could potentially be missing polyps or even rarely colon cancer.  If he changes his mind in this regard, he will reach back out to us.       Diagnoses and all orders for this visit:    1. Essential hypertension (Primary)  -     Comprehensive Metabolic Panel; Future  -     losartan (COZAAR) 50 MG tablet; Take 1 tablet by mouth Daily.  Dispense: 90 tablet; Refill: 3  -     NIFEdipine XL (PROCARDIA XL) 60 MG 24 hr tablet; Take 1 tablet by mouth every night at bedtime.  Dispense: 90 tablet; Refill: 3  -     triamterene-hydrochlorothiazide (MAXZIDE-25) 37.5-25 MG per tablet; Take 1 tablet by mouth Daily.  Dispense: 90 tablet; Refill: 3    2. Mixed hyperlipidemia  -     Comprehensive Metabolic Panel; Future  -     Lipid Panel; Future  -     TSH; Future  -     atorvastatin (LIPITOR) 20 MG tablet; Take 1 tablet by mouth Every Night.  Dispense: 90 tablet; Refill: 3    3. Other long term (current) drug therapy  -      CBC (No Diff); Future    4. Screening for prostate cancer  -     PSA Screen; Future          Follow Up   Return in about 1 year (around 7/7/2023) for Recheck, Medicare Wellness.  Patient was given instructions and counseling regarding his condition or for health maintenance advice. Please see specific information pulled into the AVS if appropriate.

## 2023-10-17 ENCOUNTER — TELEPHONE (OUTPATIENT)
Dept: FAMILY MEDICINE CLINIC | Age: 73
End: 2023-10-17
Payer: MEDICARE

## 2023-10-17 NOTE — TELEPHONE ENCOUNTER
----- Message from Sara Farr LPN sent at 7/17/2023 10:27 AM EDT -----  TICKLE BMP 90 days for hypertension

## 2023-10-26 ENCOUNTER — LAB (OUTPATIENT)
Dept: LAB | Facility: HOSPITAL | Age: 73
End: 2023-10-26
Payer: MEDICARE

## 2023-10-26 DIAGNOSIS — I10 ESSENTIAL HYPERTENSION: ICD-10-CM

## 2023-10-26 LAB
ANION GAP SERPL CALCULATED.3IONS-SCNC: 10 MMOL/L (ref 5–15)
BUN SERPL-MCNC: 19 MG/DL (ref 8–23)
BUN/CREAT SERPL: 15.2 (ref 7–25)
CALCIUM SPEC-SCNC: 9.7 MG/DL (ref 8.6–10.5)
CHLORIDE SERPL-SCNC: 104 MMOL/L (ref 98–107)
CO2 SERPL-SCNC: 27 MMOL/L (ref 22–29)
CREAT SERPL-MCNC: 1.25 MG/DL (ref 0.76–1.27)
EGFRCR SERPLBLD CKD-EPI 2021: 60.4 ML/MIN/1.73
GLUCOSE SERPL-MCNC: 94 MG/DL (ref 65–99)
POTASSIUM SERPL-SCNC: 3.5 MMOL/L (ref 3.5–5.2)
SODIUM SERPL-SCNC: 141 MMOL/L (ref 136–145)

## 2023-10-26 PROCEDURE — 36415 COLL VENOUS BLD VENIPUNCTURE: CPT

## 2023-10-26 PROCEDURE — 80048 BASIC METABOLIC PNL TOTAL CA: CPT

## 2024-07-15 DIAGNOSIS — I10 ESSENTIAL HYPERTENSION: ICD-10-CM

## 2024-07-16 RX ORDER — NIFEDIPINE 60 MG/1
60 TABLET, EXTENDED RELEASE ORAL
Qty: 90 TABLET | Refills: 0 | Status: SHIPPED | OUTPATIENT
Start: 2024-07-16 | End: 2024-07-22 | Stop reason: SDUPTHER

## 2024-07-22 ENCOUNTER — LAB (OUTPATIENT)
Dept: LAB | Facility: HOSPITAL | Age: 74
End: 2024-07-22
Payer: MEDICARE

## 2024-07-22 ENCOUNTER — OFFICE VISIT (OUTPATIENT)
Dept: FAMILY MEDICINE CLINIC | Age: 74
End: 2024-07-22
Payer: MEDICARE

## 2024-07-22 VITALS
TEMPERATURE: 98 F | WEIGHT: 198 LBS | HEIGHT: 69 IN | BODY MASS INDEX: 29.33 KG/M2 | OXYGEN SATURATION: 97 % | SYSTOLIC BLOOD PRESSURE: 131 MMHG | HEART RATE: 57 BPM | DIASTOLIC BLOOD PRESSURE: 68 MMHG

## 2024-07-22 DIAGNOSIS — Z00.00 PHYSICAL EXAM: Primary | ICD-10-CM

## 2024-07-22 DIAGNOSIS — Z79.899 OTHER LONG TERM (CURRENT) DRUG THERAPY: ICD-10-CM

## 2024-07-22 DIAGNOSIS — I10 ESSENTIAL HYPERTENSION: ICD-10-CM

## 2024-07-22 DIAGNOSIS — E78.2 MIXED HYPERLIPIDEMIA: ICD-10-CM

## 2024-07-22 LAB
ALBUMIN SERPL-MCNC: 4.3 G/DL (ref 3.5–5.2)
ALBUMIN/GLOB SERPL: 1.6 G/DL
ALP SERPL-CCNC: 99 U/L (ref 39–117)
ALT SERPL W P-5'-P-CCNC: 17 U/L (ref 1–41)
ANION GAP SERPL CALCULATED.3IONS-SCNC: 11.6 MMOL/L (ref 5–15)
AST SERPL-CCNC: 20 U/L (ref 1–40)
BILIRUB SERPL-MCNC: 0.6 MG/DL (ref 0–1.2)
BUN SERPL-MCNC: 18 MG/DL (ref 8–23)
BUN/CREAT SERPL: 16.1 (ref 7–25)
CALCIUM SPEC-SCNC: 9.4 MG/DL (ref 8.6–10.5)
CHLORIDE SERPL-SCNC: 103 MMOL/L (ref 98–107)
CHOLEST SERPL-MCNC: 135 MG/DL (ref 0–200)
CO2 SERPL-SCNC: 24.4 MMOL/L (ref 22–29)
CREAT SERPL-MCNC: 1.12 MG/DL (ref 0.76–1.27)
DEPRECATED RDW RBC AUTO: 45.9 FL (ref 37–54)
EGFRCR SERPLBLD CKD-EPI 2021: 68.5 ML/MIN/1.73
ERYTHROCYTE [DISTWIDTH] IN BLOOD BY AUTOMATED COUNT: 12.9 % (ref 12.3–15.4)
GLOBULIN UR ELPH-MCNC: 2.7 GM/DL
GLUCOSE SERPL-MCNC: 88 MG/DL (ref 65–99)
HCT VFR BLD AUTO: 43.6 % (ref 37.5–51)
HDLC SERPL-MCNC: 53 MG/DL (ref 40–60)
HGB BLD-MCNC: 14.7 G/DL (ref 13–17.7)
LDLC SERPL CALC-MCNC: 66 MG/DL (ref 0–100)
LDLC/HDLC SERPL: 1.24 {RATIO}
MCH RBC QN AUTO: 32.1 PG (ref 26.6–33)
MCHC RBC AUTO-ENTMCNC: 33.7 G/DL (ref 31.5–35.7)
MCV RBC AUTO: 95.2 FL (ref 79–97)
PLATELET # BLD AUTO: 180 10*3/MM3 (ref 140–450)
PMV BLD AUTO: 10.3 FL (ref 6–12)
POTASSIUM SERPL-SCNC: 3.7 MMOL/L (ref 3.5–5.2)
PROT SERPL-MCNC: 7 G/DL (ref 6–8.5)
RBC # BLD AUTO: 4.58 10*6/MM3 (ref 4.14–5.8)
SODIUM SERPL-SCNC: 139 MMOL/L (ref 136–145)
TRIGL SERPL-MCNC: 82 MG/DL (ref 0–150)
TSH SERPL DL<=0.05 MIU/L-ACNC: 4.05 UIU/ML (ref 0.27–4.2)
VLDLC SERPL-MCNC: 16 MG/DL (ref 5–40)
WBC NRBC COR # BLD AUTO: 5.43 10*3/MM3 (ref 3.4–10.8)

## 2024-07-22 PROCEDURE — 99214 OFFICE O/P EST MOD 30 MIN: CPT | Performed by: FAMILY MEDICINE

## 2024-07-22 PROCEDURE — 80053 COMPREHEN METABOLIC PANEL: CPT

## 2024-07-22 PROCEDURE — 36415 COLL VENOUS BLD VENIPUNCTURE: CPT

## 2024-07-22 PROCEDURE — 1159F MED LIST DOCD IN RCRD: CPT | Performed by: FAMILY MEDICINE

## 2024-07-22 PROCEDURE — 1126F AMNT PAIN NOTED NONE PRSNT: CPT | Performed by: FAMILY MEDICINE

## 2024-07-22 PROCEDURE — 3078F DIAST BP <80 MM HG: CPT | Performed by: FAMILY MEDICINE

## 2024-07-22 PROCEDURE — 1170F FXNL STATUS ASSESSED: CPT | Performed by: FAMILY MEDICINE

## 2024-07-22 PROCEDURE — 1160F RVW MEDS BY RX/DR IN RCRD: CPT | Performed by: FAMILY MEDICINE

## 2024-07-22 PROCEDURE — G0439 PPPS, SUBSEQ VISIT: HCPCS | Performed by: FAMILY MEDICINE

## 2024-07-22 PROCEDURE — 84443 ASSAY THYROID STIM HORMONE: CPT

## 2024-07-22 PROCEDURE — 3075F SYST BP GE 130 - 139MM HG: CPT | Performed by: FAMILY MEDICINE

## 2024-07-22 PROCEDURE — 80061 LIPID PANEL: CPT

## 2024-07-22 PROCEDURE — 85027 COMPLETE CBC AUTOMATED: CPT

## 2024-07-22 RX ORDER — ATORVASTATIN CALCIUM 20 MG/1
20 TABLET, FILM COATED ORAL NIGHTLY
Qty: 90 TABLET | Refills: 3 | Status: SHIPPED | OUTPATIENT
Start: 2024-07-22

## 2024-07-22 RX ORDER — TRIAMTERENE AND HYDROCHLOROTHIAZIDE 37.5; 25 MG/1; MG/1
1 TABLET ORAL DAILY
Qty: 90 TABLET | Refills: 3 | Status: SHIPPED | OUTPATIENT
Start: 2024-07-22

## 2024-07-22 RX ORDER — LOSARTAN POTASSIUM 50 MG/1
50 TABLET ORAL DAILY
Qty: 90 TABLET | Refills: 3 | Status: SHIPPED | OUTPATIENT
Start: 2024-07-22

## 2024-07-22 RX ORDER — NIFEDIPINE 60 MG/1
60 TABLET, EXTENDED RELEASE ORAL
Qty: 90 TABLET | Refills: 0 | Status: SHIPPED | OUTPATIENT
Start: 2024-07-22

## 2024-07-22 NOTE — PROGRESS NOTES
Please let Tolu know that I have reviewed his chart further.  I am not able to find a previous ultrasound of the aorta to screen for aneurysm.  This is a Medicare recommended one-time screening.  I would like to place an order for it if he is agreeable.  Confirm where he would like to have the test done, and forward back to me for action.  Let me know if he has other immediate concerns.  Thanks.

## 2024-07-22 NOTE — PROGRESS NOTES
The ABCs of the Annual Wellness Visit  Subsequent Medicare Wellness Visit    Subjective    Eliud Ramirez is a 74 y.o. patient who presents for a Subsequent Medicare Wellness Visit.    The following portions of the patient's history were reviewed and updated as appropriate: allergies, current medications, past family history, past medical history, past social history, past surgical history, and problem list.    Compared to one year ago, the patient feels his physical health is better.    Compared to one year ago, the patient feels his mental health is the same.    Recent Hospitalizations:  He was not admitted to the hospital during the last year.     Current Medical Providers:  Patient Care Team:  Franky Brenner MD as PCP - General (Family Medicine)    Outpatient Medications Prior to Visit   Medication Sig Dispense Refill    aspirin 81 MG EC tablet Take 1 tablet by mouth Daily.      atorvastatin (LIPITOR) 20 MG tablet Take 1 tablet by mouth Every Night. 90 tablet 3    losartan (COZAAR) 50 MG tablet Take 1 tablet by mouth Daily. 90 tablet 3    NIFEdipine XL (PROCARDIA XL) 60 MG 24 hr tablet Take 1 tablet by mouth every night at bedtime. 90 tablet 0    triamterene-hydrochlorothiazide (MAXZIDE-25) 37.5-25 MG per tablet Take 1 tablet by mouth Daily. 90 tablet 3     No facility-administered medications prior to visit.       No opioid medication identified on active medication list. I have reviewed chart for other potential  high risk medication/s and harmful drug interactions in the elderly.      Aspirin is on active medication list. Aspirin use is indicated based on review of current medical condition/s. Pros and cons of this therapy have been discussed today. Benefits of this medication outweigh potential harm.  Patient has been encouraged to continue taking this medication.      Patient Active Problem List   Diagnosis    Essential hypertension    Mixed hyperlipidemia    Other long term (current) drug  "therapy     Advance Care Planning  Advance Directive is not on file.  ACP discussion was held with the patient during this visit. Patient does not have an advance directive, information provided.  His wife, Arti, would make decisions if needed.       Objective    Vitals:    24 0844   BP: 140/77   Pulse: 62   Temp: 98 °F (36.7 °C)   TempSrc: Oral   SpO2: 97%  Comment: room air   Weight: 89.8 kg (198 lb)   Height: 176.5 cm (69.49\")     Estimated body mass index is 28.83 kg/m² as calculated from the following:    Height as of this encounter: 176.5 cm (69.49\").    Weight as of this encounter: 89.8 kg (198 lb).    BMI is >= 25 and <30. (Overweight) The following options were offered after discussion;: exercise counseling/recommendations and nutrition counseling/recommendations    Does the patient have evidence of cognitive impairment? No        HEALTH RISK ASSESSMENT    Smoking Status:  Social History     Tobacco Use   Smoking Status Former    Current packs/day: 0.00    Average packs/day: 1 pack/day for 6.0 years (6.0 ttl pk-yrs)    Types: Cigarettes    Start date:     Quit date:     Years since quittin.5   Smokeless Tobacco Never     Alcohol Consumption:  Social History     Substance and Sexual Activity   Alcohol Use Not Currently     Fall Risk Screen:    GHANSHYAM Fall Risk Assessment was completed, and patient is at LOW risk for falls.Assessment completed on:2024    Depression Screenin/22/2024     8:44 AM   PHQ-2/PHQ-9 Depression Screening   Little Interest or Pleasure in Doing Things 0-->not at all   Feeling Down, Depressed or Hopeless 0-->not at all   PHQ-9: Brief Depression Severity Measure Score 0       Health Habits and Functional and Cognitive Screenin/22/2024     8:48 AM   Functional & Cognitive Status   Do you have difficulty preparing food and eating? No   Do you have difficulty bathing yourself, getting dressed or grooming yourself? No   Do you have difficulty using the " toilet? No   Do you have difficulty moving around from place to place? No   Do you have trouble with steps or getting out of a bed or a chair? No   Current Diet Well Balanced Diet   Dental Exam Not up to date   Eye Exam Not up to date   Exercise (times per week) 7 times per week   Current Exercises Include Home Fitness Gym   Do you need help using the phone?  No   Are you deaf or do you have serious difficulty hearing?  No   Do you need help to go to places out of walking distance? No   Do you need help shopping? No   Do you need help preparing meals?  No   Do you need help with housework?  No   Do you need help with laundry? No   Do you need help taking your medications? No   Do you need help managing money? No   Do you ever drive or ride in a car without wearing a seat belt? No   Have you felt unusual stress, anger or loneliness in the last month? No   Who do you live with? Spouse   If you need help, do you have trouble finding someone available to you? No   Have you been bothered in the last four weeks by sexual problems? No   Do you have difficulty concentrating, remembering or making decisions? No       Age-appropriate Screening Schedule:  Refer to the list below for future screening recommendations based on patient's age, sex and/or medical conditions. Orders for these recommended tests are listed in the plan section. The patient has been provided with a written plan.    Health Maintenance   Topic Date Due    TDAP/TD VACCINES (1 - Tdap) Never done    ZOSTER VACCINE (1 of 2) Never done    RSV Vaccine - Adults (1 - 1-dose 60+ series) Never done    AAA SCREEN (ONE-TIME)  Never done    LIPID PANEL  07/14/2024    COLORECTAL CANCER SCREENING  09/30/2024    COVID-19 Vaccine (1 - 2023-24 season) 07/22/2025 (Originally 9/1/2023)    INFLUENZA VACCINE  08/01/2024    ANNUAL WELLNESS VISIT  07/22/2025    BMI FOLLOWUP  07/22/2025    HEPATITIS C SCREENING  Completed    Pneumococcal Vaccine 65+  Completed          CMS  "Preventative Services Quick Reference  Risk Factors Identified During Encounter  Immunizations Discussed/Encouraged: Tdap, Shingrix, COVID19, and RSV (Respiratory Syncytial Virus)  The above risks/problems have been discussed with the patient.  Pertinent information has been shared with the patient in the After Visit Summary.  An After Visit Summary and PPPS were made available to the patient.    Follow Up:   Next Medicare Wellness visit to be scheduled in 1 year.     Additional E&M Note during same encounter follows:  Patient has multiple medical problems which are significant and separately identifiable that require additional work above and beyond the Medicare Wellness Visit.      Chief Complaint:  Blood pressure, cholesterol    Subjective    History of Present Illness:  In addition to the Medicare wellness exam, Tolu is also here for follow-up on his usual care.  He has hypertension and elevated cholesterol for which he remains on treatments as noted.  His blood pressure is borderline here.  He brings a log of his blood pressures and heart rates.  His blood pressure has been running in the 110s or 120s systolic.  He normally has a low heart rate.    Review of Systems:  Review of Systems   Constitutional:  Negative for chills and fever.   Respiratory:  Negative for cough and shortness of breath.    Cardiovascular:  Negative for chest pain and palpitations.   Gastrointestinal:  Negative for abdominal pain, nausea and vomiting.      Objective   Vital Signs:  Vitals:    07/22/24 0844 07/22/24 0930   BP: 140/77 131/68   BP Location:  Right arm   Patient Position:  Sitting   Pulse: 62 57   Temp: 98 °F (36.7 °C)    TempSrc: Oral    SpO2: 97%  Comment: room air    Weight: 89.8 kg (198 lb)    Height: 176.5 cm (69.49\")    Body mass index is 28.83 kg/m².    Physical Exam  Vitals and nursing note reviewed.   Constitutional:       General: He is not in acute distress.     Appearance: He is not ill-appearing.   HENT:      " Right Ear: Tympanic membrane and ear canal normal.      Left Ear: Tympanic membrane and ear canal normal.      Ears:      Comments: Hearing is normal with forced whisper bilaterally.     Mouth/Throat:      Mouth: Mucous membranes are moist.      Comments: Pharynx appears normal  Eyes:      Extraocular Movements: Extraocular movements intact.      Pupils: Pupils are equal, round, and reactive to light.      Comments: Binocular vision is 20/20 with correction.   Neck:      Thyroid: No thyromegaly.   Cardiovascular:      Rate and Rhythm: Normal rate and regular rhythm.      Heart sounds: No murmur heard.  Pulmonary:      Effort: Pulmonary effort is normal.      Breath sounds: Normal breath sounds.   Abdominal:      General: There is no distension.      Palpations: Abdomen is soft. There is no mass.      Tenderness: There is no abdominal tenderness.   Musculoskeletal:      Cervical back: Normal range of motion.   Skin:     Findings: No lesion or rash.   Neurological:      General: No focal deficit present.      Mental Status: He is oriented to person, place, and time.      Cranial Nerves: No cranial nerve deficit.   Psychiatric:         Mood and Affect: Mood normal.       The following data was reviewed by Franky Brenner MD on 07/22/2024.  Lab Results   Component Value Date    WBC 5.75 07/14/2023    HGB 14.1 07/14/2023    HCT 41.5 07/14/2023    MCV 94.7 07/14/2023     07/14/2023     Lab Results   Component Value Date    GLUCOSE 94 10/26/2023    BUN 19 10/26/2023    CREATININE 1.25 10/26/2023     10/26/2023    K 3.5 10/26/2023     10/26/2023    CO2 27.0 10/26/2023    CALCIUM 9.7 10/26/2023    PROTEINTOT 6.9 07/14/2023    ALBUMIN 4.4 07/14/2023    ALT 17 07/14/2023    AST 21 07/14/2023    ALKPHOS 97 07/14/2023    BILITOT 0.6 07/14/2023    EGFR 60.4 10/26/2023    GLOB 2.5 07/14/2023    AGRATIO 1.8 07/14/2023    BCR 15.2 10/26/2023    ANIONGAP 10.0 10/26/2023      Lab Results   Component Value  "Date    CHOL 135 07/14/2023    CHLPL 167 08/06/2020    TRIG 88 07/14/2023    HDL 49 07/14/2023    LDL 69 07/14/2023     Lab Results   Component Value Date    TSH 2.480 07/14/2023     No results found for: \"HGBA1C\"  Lab Results   Component Value Date    PSA 3.440 07/14/2023    PSA 3.160 07/07/2022    PSA 3.16 04/22/2021             Assessment and Plan:   Today, we have reviewed his care.  Overall, Tolu is doing well.  He has lost about 20 pounds in the last year.  I have encouraged him to be diligent to try to keep that weight off.  We discussed cancer screenings.  We have mutually agreed not to pursue additional prostate cancer screening given his age.  I have encouraged him strongly to move forward with colonoscopy given the positive Cologuard result.  He declines.  We discussed that he could be missing a polyp or even rarely colon cancer.  Delaying evaluation could lead to a situation where cure is not possible if cancer is present.  We will keep an open door regarding this.  Vaccines have been reviewed today as well.    Regarding his usual care, Tolu is doing well.  We will refill his medications and update labs as noted.  Tentative follow-up with me will be again in about a year.    Diagnoses and all orders for this visit:    1. Physical exam (Primary)    2. Essential hypertension  -     Comprehensive Metabolic Panel; Future  -     Lipid Panel; Future  -     losartan (COZAAR) 50 MG tablet; Take 1 tablet by mouth Daily.  Dispense: 90 tablet; Refill: 3  -     NIFEdipine XL (PROCARDIA XL) 60 MG 24 hr tablet; Take 1 tablet by mouth every night at bedtime.  Dispense: 90 tablet; Refill: 0  -     triamterene-hydrochlorothiazide (MAXZIDE-25) 37.5-25 MG per tablet; Take 1 tablet by mouth Daily.  Dispense: 90 tablet; Refill: 3    3. Mixed hyperlipidemia  -     Comprehensive Metabolic Panel; Future  -     Lipid Panel; Future  -     TSH; Future  -     atorvastatin (LIPITOR) 20 MG tablet; Take 1 tablet by mouth Every Night.  " Dispense: 90 tablet; Refill: 3    4. Other long term (current) drug therapy  -     CBC (No Diff); Future       Follow Up  Return in about 1 year (around 7/22/2025) for Recheck, Medicare Wellness.  Patient was given instructions and counseling regarding his condition or for health maintenance advice. Please see specific information pulled into the AVS if appropriate.

## 2025-01-13 DIAGNOSIS — I10 ESSENTIAL HYPERTENSION: ICD-10-CM

## 2025-01-13 RX ORDER — NIFEDIPINE 60 MG/1
60 TABLET, EXTENDED RELEASE ORAL
Qty: 90 TABLET | Refills: 0 | Status: SHIPPED | OUTPATIENT
Start: 2025-01-13

## 2025-04-14 DIAGNOSIS — I10 ESSENTIAL HYPERTENSION: ICD-10-CM

## 2025-04-14 RX ORDER — NIFEDIPINE 60 MG/1
60 TABLET, EXTENDED RELEASE ORAL
Qty: 90 TABLET | Refills: 1 | Status: SHIPPED | OUTPATIENT
Start: 2025-04-14

## 2025-06-23 ENCOUNTER — TELEPHONE (OUTPATIENT)
Dept: FAMILY MEDICINE CLINIC | Age: 75
End: 2025-06-23

## 2025-06-23 NOTE — TELEPHONE ENCOUNTER
"Caller: Eliud Ramirez \"Tolu\"    Relationship: Self    Best call back number: [phone]     What medication are you requesting: ANTIFUNGAL CREAM    What are your current symptoms: RING WORK    How long have you been experiencing symptoms: 8 WEEKS    If a prescription is needed, what is your preferred pharmacy and phone number: [facility] [address] [place] [address] LOT  - [phone]  - [phone]      Additional notes:    PATIENT STATES OVER THE COUNTER MEDICATION ISN'T WORKING.   "

## 2025-07-15 DIAGNOSIS — I10 ESSENTIAL HYPERTENSION: ICD-10-CM

## 2025-07-15 RX ORDER — LOSARTAN POTASSIUM 50 MG/1
50 TABLET ORAL DAILY
Qty: 90 TABLET | Refills: 0 | Status: SHIPPED | OUTPATIENT
Start: 2025-07-15

## 2025-07-19 DIAGNOSIS — I10 ESSENTIAL HYPERTENSION: ICD-10-CM

## 2025-07-21 RX ORDER — TRIAMTERENE AND HYDROCHLOROTHIAZIDE 37.5; 25 MG/1; MG/1
1 TABLET ORAL DAILY
Qty: 90 TABLET | Refills: 0 | Status: SHIPPED | OUTPATIENT
Start: 2025-07-21

## 2025-08-04 DIAGNOSIS — E78.2 MIXED HYPERLIPIDEMIA: ICD-10-CM

## 2025-08-04 RX ORDER — ATORVASTATIN CALCIUM 20 MG/1
20 TABLET, FILM COATED ORAL NIGHTLY
Qty: 90 TABLET | Refills: 0 | Status: SHIPPED | OUTPATIENT
Start: 2025-08-04

## 2025-08-29 ENCOUNTER — OFFICE VISIT (OUTPATIENT)
Dept: FAMILY MEDICINE CLINIC | Age: 75
End: 2025-08-29
Payer: MEDICARE

## 2025-08-29 ENCOUNTER — LAB (OUTPATIENT)
Dept: LAB | Facility: HOSPITAL | Age: 75
End: 2025-08-29
Payer: MEDICARE

## 2025-08-29 VITALS
HEIGHT: 69 IN | OXYGEN SATURATION: 95 % | WEIGHT: 189.8 LBS | SYSTOLIC BLOOD PRESSURE: 120 MMHG | TEMPERATURE: 97.5 F | HEART RATE: 64 BPM | DIASTOLIC BLOOD PRESSURE: 65 MMHG | BODY MASS INDEX: 28.11 KG/M2

## 2025-08-29 DIAGNOSIS — I10 ESSENTIAL HYPERTENSION: ICD-10-CM

## 2025-08-29 DIAGNOSIS — Z00.00 PHYSICAL EXAM: Primary | ICD-10-CM

## 2025-08-29 DIAGNOSIS — R21 RASH: ICD-10-CM

## 2025-08-29 DIAGNOSIS — Z79.899 OTHER LONG TERM (CURRENT) DRUG THERAPY: ICD-10-CM

## 2025-08-29 DIAGNOSIS — E78.2 MIXED HYPERLIPIDEMIA: ICD-10-CM

## 2025-08-29 DIAGNOSIS — Z12.5 PROSTATE CANCER SCREENING: ICD-10-CM

## 2025-08-29 LAB
ALBUMIN SERPL-MCNC: 4.3 G/DL (ref 3.5–5.2)
ALBUMIN/GLOB SERPL: 1.4 G/DL
ALP SERPL-CCNC: 108 U/L (ref 39–117)
ALT SERPL W P-5'-P-CCNC: 13 U/L (ref 1–41)
ANION GAP SERPL CALCULATED.3IONS-SCNC: 13 MMOL/L (ref 5–15)
AST SERPL-CCNC: 22 U/L (ref 1–40)
BACTERIA UR QL AUTO: NORMAL /HPF
BASOPHILS # BLD AUTO: 0.07 10*3/MM3 (ref 0–0.2)
BASOPHILS NFR BLD AUTO: 1.1 % (ref 0–1.5)
BILIRUB SERPL-MCNC: 0.5 MG/DL (ref 0–1.2)
BILIRUB UR QL STRIP: NEGATIVE
BUN SERPL-MCNC: 18 MG/DL (ref 8–23)
BUN/CREAT SERPL: 16.8 (ref 7–25)
CALCIUM SPEC-SCNC: 9.5 MG/DL (ref 8.6–10.5)
CHLORIDE SERPL-SCNC: 101 MMOL/L (ref 98–107)
CHOLEST SERPL-MCNC: 135 MG/DL (ref 0–200)
CLARITY UR: CLEAR
CO2 SERPL-SCNC: 25 MMOL/L (ref 22–29)
COLOR UR: NORMAL
CREAT SERPL-MCNC: 1.07 MG/DL (ref 0.76–1.27)
DEPRECATED RDW RBC AUTO: 44.5 FL (ref 37–54)
EGFRCR SERPLBLD CKD-EPI 2021: 71.9 ML/MIN/1.73
EOSINOPHIL # BLD AUTO: 0.5 10*3/MM3 (ref 0–0.4)
EOSINOPHIL NFR BLD AUTO: 7.8 % (ref 0.3–6.2)
ERYTHROCYTE [DISTWIDTH] IN BLOOD BY AUTOMATED COUNT: 12.6 % (ref 12.3–15.4)
GLOBULIN UR ELPH-MCNC: 3 GM/DL
GLUCOSE SERPL-MCNC: 89 MG/DL (ref 65–99)
GLUCOSE UR STRIP-MCNC: NEGATIVE MG/DL
HCT VFR BLD AUTO: 42.1 % (ref 37.5–51)
HDLC SERPL-MCNC: 55 MG/DL (ref 40–60)
HGB BLD-MCNC: 14.5 G/DL (ref 13–17.7)
HGB UR QL STRIP.AUTO: NEGATIVE
IMM GRANULOCYTES # BLD AUTO: 0 10*3/MM3 (ref 0–0.05)
IMM GRANULOCYTES NFR BLD AUTO: 0 % (ref 0–0.5)
KETONES UR QL STRIP: NEGATIVE
LDLC SERPL CALC-MCNC: 66 MG/DL (ref 0–100)
LDLC/HDLC SERPL: 1.21 {RATIO}
LEUKOCYTE ESTERASE UR QL STRIP.AUTO: NEGATIVE
LYMPHOCYTES # BLD AUTO: 1.5 10*3/MM3 (ref 0.7–3.1)
LYMPHOCYTES NFR BLD AUTO: 23.3 % (ref 19.6–45.3)
MCH RBC QN AUTO: 32.6 PG (ref 26.6–33)
MCHC RBC AUTO-ENTMCNC: 34.4 G/DL (ref 31.5–35.7)
MCV RBC AUTO: 94.6 FL (ref 79–97)
MONOCYTES # BLD AUTO: 0.5 10*3/MM3 (ref 0.1–0.9)
MONOCYTES NFR BLD AUTO: 7.8 % (ref 5–12)
NEUTROPHILS NFR BLD AUTO: 3.87 10*3/MM3 (ref 1.7–7)
NEUTROPHILS NFR BLD AUTO: 60 % (ref 42.7–76)
NITRITE UR QL STRIP: NEGATIVE
PH UR STRIP.AUTO: 7 [PH] (ref 5–8)
PLATELET # BLD AUTO: 216 10*3/MM3 (ref 140–450)
PMV BLD AUTO: 10.2 FL (ref 6–12)
POTASSIUM SERPL-SCNC: 3.7 MMOL/L (ref 3.5–5.2)
PROT SERPL-MCNC: 7.3 G/DL (ref 6–8.5)
PROT UR QL STRIP: NEGATIVE
PSA SERPL-MCNC: 7.08 NG/ML (ref 0–4)
RBC # BLD AUTO: 4.45 10*6/MM3 (ref 4.14–5.8)
RBC # UR STRIP: NORMAL /HPF
REF LAB TEST METHOD: NORMAL
SODIUM SERPL-SCNC: 139 MMOL/L (ref 136–145)
SP GR UR STRIP: 1.01 (ref 1–1.03)
SQUAMOUS #/AREA URNS HPF: NORMAL /HPF
TRIGL SERPL-MCNC: 67 MG/DL (ref 0–150)
TSH SERPL DL<=0.05 MIU/L-ACNC: 2.49 UIU/ML (ref 0.27–4.2)
UROBILINOGEN UR QL STRIP: NORMAL
VLDLC SERPL-MCNC: 14 MG/DL (ref 5–40)
WBC # UR STRIP: NORMAL /HPF
WBC NRBC COR # BLD AUTO: 6.44 10*3/MM3 (ref 3.4–10.8)

## 2025-08-29 PROCEDURE — 86038 ANTINUCLEAR ANTIBODIES: CPT

## 2025-08-29 PROCEDURE — 84443 ASSAY THYROID STIM HORMONE: CPT

## 2025-08-29 PROCEDURE — 81001 URINALYSIS AUTO W/SCOPE: CPT

## 2025-08-29 PROCEDURE — 36415 COLL VENOUS BLD VENIPUNCTURE: CPT

## 2025-08-29 PROCEDURE — 80053 COMPREHEN METABOLIC PANEL: CPT

## 2025-08-29 PROCEDURE — 80061 LIPID PANEL: CPT

## 2025-08-29 PROCEDURE — G0103 PSA SCREENING: HCPCS

## 2025-08-29 PROCEDURE — 85025 COMPLETE CBC W/AUTO DIFF WBC: CPT

## 2025-08-29 RX ORDER — CLOTRIMAZOLE AND BETAMETHASONE DIPROPIONATE 10; .64 MG/G; MG/G
1 CREAM TOPICAL 2 TIMES DAILY PRN
Qty: 45 G | Refills: 2 | Status: SHIPPED | OUTPATIENT
Start: 2025-08-29

## 2025-08-29 RX ORDER — LOSARTAN POTASSIUM 50 MG/1
50 TABLET ORAL DAILY
Qty: 90 TABLET | Refills: 3 | Status: SHIPPED | OUTPATIENT
Start: 2025-08-29

## 2025-08-29 RX ORDER — LATANOPROST 50 UG/ML
SOLUTION/ DROPS OPHTHALMIC
COMMUNITY
Start: 2025-08-08

## 2025-08-29 RX ORDER — TRIAMTERENE AND HYDROCHLOROTHIAZIDE 37.5; 25 MG/1; MG/1
1 TABLET ORAL DAILY
Qty: 90 TABLET | Refills: 3 | Status: SHIPPED | OUTPATIENT
Start: 2025-08-29

## 2025-08-29 RX ORDER — NIFEDIPINE 60 MG/1
60 TABLET, EXTENDED RELEASE ORAL
Qty: 90 TABLET | Refills: 3 | Status: SHIPPED | OUTPATIENT
Start: 2025-08-29

## 2025-08-29 RX ORDER — ATORVASTATIN CALCIUM 20 MG/1
20 TABLET, FILM COATED ORAL NIGHTLY
Qty: 90 TABLET | Refills: 3 | Status: SHIPPED | OUTPATIENT
Start: 2025-08-29